# Patient Record
Sex: FEMALE | Race: WHITE | NOT HISPANIC OR LATINO | Employment: OTHER | URBAN - METROPOLITAN AREA
[De-identification: names, ages, dates, MRNs, and addresses within clinical notes are randomized per-mention and may not be internally consistent; named-entity substitution may affect disease eponyms.]

---

## 2017-01-02 ENCOUNTER — HOSPITAL ENCOUNTER (OUTPATIENT)
Dept: CARDIAC REHAB | Facility: CLINIC | Age: 82
End: 2017-01-02
Attending: NURSE PRACTITIONER
Payer: COMMERCIAL

## 2017-01-02 PROCEDURE — 40000116 ZZH STATISTIC OP CR VISIT

## 2017-01-02 PROCEDURE — 93798 PHYS/QHP OP CAR RHAB W/ECG: CPT

## 2017-01-04 ENCOUNTER — HOSPITAL ENCOUNTER (OUTPATIENT)
Dept: CARDIAC REHAB | Facility: CLINIC | Age: 82
End: 2017-01-04
Attending: NURSE PRACTITIONER
Payer: COMMERCIAL

## 2017-01-04 PROCEDURE — 40000116 ZZH STATISTIC OP CR VISIT: Performed by: REHABILITATION PRACTITIONER

## 2017-01-04 PROCEDURE — 93798 PHYS/QHP OP CAR RHAB W/ECG: CPT | Mod: KX | Performed by: REHABILITATION PRACTITIONER

## 2017-01-06 ENCOUNTER — HOSPITAL ENCOUNTER (OUTPATIENT)
Dept: CARDIAC REHAB | Facility: CLINIC | Age: 82
End: 2017-01-06
Attending: NURSE PRACTITIONER
Payer: COMMERCIAL

## 2017-01-06 VITALS — HEIGHT: 62 IN | WEIGHT: 121.6 LBS | BODY MASS INDEX: 22.38 KG/M2

## 2017-01-06 PROCEDURE — 40000575 ZZH STATISTIC OP CARDIAC VISIT #2: Performed by: OCCUPATIONAL THERAPIST

## 2017-01-06 PROCEDURE — 93798 PHYS/QHP OP CAR RHAB W/ECG: CPT | Mod: KX | Performed by: OCCUPATIONAL THERAPIST

## 2017-01-06 PROCEDURE — 93797 PHYS/QHP OP CAR RHAB WO ECG: CPT | Performed by: OCCUPATIONAL THERAPIST

## 2017-01-06 PROCEDURE — 93798 PHYS/QHP OP CAR RHAB W/ECG: CPT | Performed by: OCCUPATIONAL THERAPIST

## 2017-01-06 PROCEDURE — 40000116 ZZH STATISTIC OP CR VISIT: Performed by: OCCUPATIONAL THERAPIST

## 2017-01-06 ASSESSMENT — 6 MINUTE WALK TEST (6MWT)
MALE CALC: 1244.36
PREDICTED: 1251.95
TOTAL DISTANCE WALKED (FT): 1270
GENDER SELECTION: FEMALE
FEMALE CALC: 1326.99

## 2017-01-06 NOTE — PROGRESS NOTES
01/06/17 1400   Session   Session Progress Update   Certified through this date 01/05/17   Cardiac Rehab Assessment   Cardiac Rehab Assessment Charlotte Bradshaw is a 81 year old female with a past medical history of CAD with NSTEMI in 5/2015 and was admittted on 11/21/2016 with chest painand pressure. Charlotte has a history of situational depression from the loss of sigrid daughter and recently found out that her daughter in law has cancer.   She is reluctant to participate in cardiac rehab but understand that it will be a benefit to her health.   11/30  Patient did have 30 day ITP completed today.  12/28 Reviewed progress with Pt today. She has been participating only 1 x week due to the stress of the holidays, but plans to increase up to 3 x week after the New Year. She is progressing slowly, and appears to be actively grieving and easily brought to tears. She affirmed the goals she had made, and is planning to meet in a private consult to work on healthy grieving skills, and may follow up with the program . 1/6 Patient spoke to therapist about her grief and her daughter.  Therapist has known patient for several years.  Previously, patient could not talk without crying.  Now she only cries when her daughter is brought up directly.  She  is making progress in her grief, but does not want people to bring it up to her in rehab.  She understands the consequences of unresolved grief, but feels that she is not being seen or heard except for her grief. She did not find her previous consult with  helpful and declined another visit.  She is willing to look in general at stress and how to handle it.  She still is exploring where and when to exercise.  She was given information on the Wel program and encouraged to check out Silver [employer]neakers too.She continues to benefit from skilled services for progressive, monitored exercise, targeted education and behavior change counseling when indicated. Pt is also benefitting from  "skilled emotional support.     General Information   Treatment Diagnosis Stent   Date of Treatment Diagnosis 11/22/16   Secondary Treatment Diagnosis Stent   Significant Past CV History Previous PCI;Clinical significant depression or depressive symptoms   Comorbidities Previous MI   Other Medical History .pmh   Lead up symptoms Chest Pressure   Onset of Symptoms 11/22/16   Hospital Location [facility]   Hospital Discharge Date 11/23/16   Signs and Symptoms Post Hospital Discharge Anxiety   Outpatient Cardiac Rehab Start Date 11/28/16   Primary Physician Dr. Soni Blackwell   Primary Physician Follow Up NA   Surgeon Melvin Rodriguez MD   Surgeon Follow Up Scheduled  (12/5)   Cardiologist Dr. Dickson   Cardiologist Follow Up Scheduled  (2/3)   Ejection Fraction 60%   Risk Stratification Low   Summary of Cath Report   Summary of Cath Report Available   Date Performed 11/21/16   Left Main Left Main (LM) large caliber vessel with no angiographic evidence   LAD Left Anterior Descending (LAD): large caliber type 3 vessel which   LCX There is moderate focal disease with 50 % stenosis in the mid   RCA large caliber vessel which gives rise   Living and Work Status    Living Arrangements and Social Status house   Support System Live with an adult   Return to Employment Retired   Occupation    Preventative Medications   CMS recommended medications Ace inhibitors;Antiplatelets;Beta Blocker;Estrogen;Lipid Lowering   Falls Screen   Have you fallen two or more times in the past year? No   Pain   Patient Currently in Pain No   Physical Assessments   Incisions WNL   Edema Not assessed   Right Lung Sounds not assessed   Left Lung Sounds not assessed   Individualized Treatment Plan   Monitored Sessions Scheduled 24   Monitored Sessions Attended 7   Oxygen   Supplemental Oxygen needed No   Nutrition Management - Weight Management   Assessment Re-assessment   Age 81   Weight 55.157 kg (121 lb 9.6 oz)   Height 1.575 m (5' 2.01\") "   BMI (Calculated) 22.28   Initial Rate Your Plate Score. Dietary tool to assess eating patterns. Scores range from 24 to 72. The higher the score the healthier the eating pattern. 54   Weight Management Comments Pt is at a healthy weight    Nutrition Management - Lipids   Lipids Labs Available   Date 08/05/15   Total Cholesterol 117   Triglycerides 52   HDL 63   LDL 44   Nutrition Management - Diabetes   Diabetes No   Nutrition Management Summary   Dietary Recommendations Low Fat;Low Cholesterol;Low Sodium   Stages of Change for Diet Compliance Action   Nutrition Summary Comments Pt is currently at a healthy weights   Psychosocial Management   Psychosocial Assessment Re-assessment   Is there history of clinical depression or increased risk of depression? History of clinical depression   Current Level of Stress per Patient Report Moderate    Current Coping Skills Patient Unable to Identify Personal Coping Skills   Initial Patient Health Questionnaire -9 Score (PHQ-9) for depression. 5-9 Minimal symptoms, 10-14 Minor depression, 15-19 Major depression, moderately severe, > 20 Major depression, severe  5   Initial [facility] Survey score.  Quality of Life:   If total score > 25 review individual areas where patient rated a 4 or 5.  Consider patients current medical condition and what role that plays on the score.   Adjust treatment protocol to improve areas of concern.  Consider the following:  PHQ9 score, DASI, and re-assessment within the next 30 days to assist with developing treatments.  24   Interventions Planned Patient to verbalize understanding of behavioral assessment results;Patient to verbalize understanding of negative impact of stress to personal health   Patient Goal Yes   Goal Description Pt will describe 3 positive ways to deal with stress  that she can do to ohelp take care of her.   Goal Target Date 12/28/16   Progress Towards Goal 12/28 Pt has met once before with program  and is  considering it again. At this time, she has set up a private consult and would like to talk about healthy grieving, 1/6 Patient has decided she does not want people asking her about her grief anymore.  She is willing  to try to think of ways to handle stress, but does not want the constant mention of her daughter.  Patient does not feel that people are seeing any other aspect of her and may not be hearing her other concerns.  She did agree to let therapist give her a daily meditation book for people dealing with loss.   Psychosocial Comments Pt lost daughter 4.5 years ago and has learned taht daughter in law has cancer.   Other Core Components - Hypertension   History of or Diagnosis of Hypertension No   Currently taking Anti-Hypertensives Yes;Beta blocker;Ace Inhibitor   Other Core Components - Tobacco   History of Tobacco Use Never   Other Core Components Summary   Interventions Planned Other (see comments);Educate on importance of monitoring daily weight  (Check in and offer support for stress and depression )   Activity/Exercise History   Activity/Exercise Assessment Re-assessment   Activity/Exercise Status prior to event? Was Physically Active;Participated in an Exercise Program   Number of Days Currently participating in Moderate Physical Activity? 7   Number of Days Currently performing  Aerobic Exercise (including rehab)? 2   Number of Minutes per Session Currently of Aerobic Exercise (average)? 30-40  (Pt was a member of the [facility]EL program last spring)   Patient Goals Goal #1;Goal #2   Goal #1 Description Pt will find a  by the end of the year or purchase a gentle yoga DVD.   Goal #1 Target Date 01/02/17   Goal #1 Progress Towards Goal 12/28 within the next 2 week., Pt will check out current class offerings, and the costs. She would like to start by end of January. 1/6 Patient hasn't found any yoga tapes yet.  Talked with her about using [employer] tapes.  Sheis interested in Silver Sneakers and is  going to see if she can visit a location before joining.   Goal #2 Description Pt will demostarted that she is able to complete 45 minutes of moderate aerobic exercise with a normal CV response to that she can restart the WEL program.   Goal #2 Target Date 01/12/17   Goal #2 Progress Towards Goal 12/28 Pt will start by beginning indoor walking after the New Year holiday.  1/6 Patient has not started walking yet , but she is willing to be more actively seeking a place to exercise.  she said she will bring her treadmill out to use.    Exercise Assessment   6 Minute Walk Predicted - Gender Selection Female   6 Minute Walk Predicted (Male) 1244.36   6 Minute Walk Predicted (Female) 1326.99   Initial 6 Minute Walk Distance (Feet) 1270 ft   Resting HR 61 bpm   Exercise HR 78 bpm   Post Exercise HR 53 bpm   Resting /64 mmHg   Exercise /70 mmHg   Post Exercise /80 mmHg   Effort Rating 4   Current MET Level    MET Level Goal 5-6   ECG Rhythm Sinus bradycardia   Ectopy None   Current Symptoms Denies symptoms   Exercise Prescription   Mode Treadmill;Nustep;Airdyne;Ambulation;Weights   Duration/Time 15-30 min   Frequency 2 days/week  (Pt will increase to 3 x week soon after the New Year)   THR (85% of age predicted max HR) 118.15   OMNI Effort Rating (0-10 Scale) 4-6/10   Progression Continuous bouts;Total exercise time of 30-45 minutes;Progress peak intensity by 1/2 MET per week   Recommended Home Exercise   Type of Exercise Walking   Frequency (days per week) 2-3   Duration (minutes per session) 15-30 min   Effort Rating Recommended 4-6/10   30 Day Exercise Plan 12/28 Pt will start home walking after the New Year.    Current Home Exercise   Type of Exercise None   Follow-up/On-going Support   Provider follow-up needed on the following No follow-up needed   Learning Assessment   Learner Patient   Primary Language English   Preferred Learning Style Reading   Barriers to Learning No barriers noted   Patient  Education   Education recommended Exercise Principles;Stress Management   Education classes attended Patient Attended Education in the Past   Follow-up/On-going Support   Provider follow-up needed on the following No follow-up needed   Living and Work Status   Living Arrangements house   ADL Limitations None   Occupation Volunteer

## 2017-01-09 ENCOUNTER — HOSPITAL ENCOUNTER (OUTPATIENT)
Dept: CARDIAC REHAB | Facility: CLINIC | Age: 82
End: 2017-01-09
Attending: NURSE PRACTITIONER
Payer: COMMERCIAL

## 2017-01-09 PROCEDURE — 40000116 ZZH STATISTIC OP CR VISIT: Performed by: OCCUPATIONAL THERAPIST

## 2017-01-09 PROCEDURE — 93798 PHYS/QHP OP CAR RHAB W/ECG: CPT | Mod: KX | Performed by: OCCUPATIONAL THERAPIST

## 2017-01-11 ENCOUNTER — HOSPITAL ENCOUNTER (OUTPATIENT)
Dept: CARDIAC REHAB | Facility: CLINIC | Age: 82
End: 2017-01-11
Attending: NURSE PRACTITIONER
Payer: COMMERCIAL

## 2017-01-11 PROCEDURE — 40000116 ZZH STATISTIC OP CR VISIT: Performed by: OCCUPATIONAL THERAPIST

## 2017-01-11 PROCEDURE — 93797 PHYS/QHP OP CAR RHAB WO ECG: CPT

## 2017-01-11 PROCEDURE — 93798 PHYS/QHP OP CAR RHAB W/ECG: CPT | Mod: KX | Performed by: OCCUPATIONAL THERAPIST

## 2017-01-11 PROCEDURE — 40000575 ZZH STATISTIC OP CARDIAC VISIT #2

## 2017-01-13 ENCOUNTER — HOSPITAL ENCOUNTER (OUTPATIENT)
Dept: CARDIAC REHAB | Facility: CLINIC | Age: 82
End: 2017-01-13
Attending: NURSE PRACTITIONER
Payer: COMMERCIAL

## 2017-01-13 PROCEDURE — 40000116 ZZH STATISTIC OP CR VISIT: Performed by: OCCUPATIONAL THERAPIST

## 2017-01-13 PROCEDURE — 93798 PHYS/QHP OP CAR RHAB W/ECG: CPT | Mod: KX | Performed by: OCCUPATIONAL THERAPIST

## 2017-01-16 ENCOUNTER — HOSPITAL ENCOUNTER (OUTPATIENT)
Dept: CARDIAC REHAB | Facility: CLINIC | Age: 82
End: 2017-01-16
Attending: NURSE PRACTITIONER
Payer: COMMERCIAL

## 2017-01-16 PROCEDURE — 40000116 ZZH STATISTIC OP CR VISIT: Performed by: OCCUPATIONAL THERAPIST

## 2017-01-16 PROCEDURE — 93797 PHYS/QHP OP CAR RHAB WO ECG: CPT | Mod: KX | Performed by: OCCUPATIONAL THERAPIST

## 2017-01-16 PROCEDURE — 40000575 ZZH STATISTIC OP CARDIAC VISIT #2: Performed by: OCCUPATIONAL THERAPIST

## 2017-01-16 PROCEDURE — 93798 PHYS/QHP OP CAR RHAB W/ECG: CPT | Mod: KX | Performed by: OCCUPATIONAL THERAPIST

## 2017-01-18 ENCOUNTER — HOSPITAL ENCOUNTER (OUTPATIENT)
Dept: CARDIAC REHAB | Facility: CLINIC | Age: 82
End: 2017-01-18
Attending: NURSE PRACTITIONER
Payer: COMMERCIAL

## 2017-01-18 PROCEDURE — 40000116 ZZH STATISTIC OP CR VISIT: Mod: KX

## 2017-01-18 PROCEDURE — 93798 PHYS/QHP OP CAR RHAB W/ECG: CPT

## 2017-01-20 ENCOUNTER — HOSPITAL ENCOUNTER (OUTPATIENT)
Dept: CARDIAC REHAB | Facility: CLINIC | Age: 82
End: 2017-01-20
Attending: NURSE PRACTITIONER
Payer: COMMERCIAL

## 2017-01-20 VITALS — WEIGHT: 123 LBS | HEIGHT: 62 IN | BODY MASS INDEX: 22.63 KG/M2

## 2017-01-20 PROCEDURE — 93798 PHYS/QHP OP CAR RHAB W/ECG: CPT | Mod: KX | Performed by: REHABILITATION PRACTITIONER

## 2017-01-20 PROCEDURE — 40000116 ZZH STATISTIC OP CR VISIT: Performed by: REHABILITATION PRACTITIONER

## 2017-01-20 ASSESSMENT — 6 MINUTE WALK TEST (6MWT)
FEMALE CALC: 1322.21
MALE CALC: 1240.68
GENDER SELECTION: FEMALE
PREDICTED: 1248.25
TOTAL DISTANCE WALKED (FT): 1270

## 2017-01-20 NOTE — PROGRESS NOTES
Physician cosignature/electronic signature indicates approval of this ITP document. I have established, reviewed and made necessary changes to the individualized treatment plan and exercise prescription for this patient.         01/20/17 1300   Session   Session Progress Update   Certified through this date 01/05/17   Cardiac Rehab Assessment   Cardiac Rehab Assessment Charlotte Bradshaw is a 81 year old female with a past medical history of CAD with NSTEMI in 5/2015 and was admittted on 11/21/2016 with chest painand pressure. Charlotte has a history of situational depression from the loss of sigrid daughter and recently found out that her daughter in law has cancer.   She is reluctant to participate in cardiac rehab but understand that it will be a benefit to her health.   11/30  Patient did have 30 day ITP completed today.  12/28 Reviewed progress with Pt today. She has been participating only 1 x week due to the stress of the holidays, but plans to increase up to 3 x week after the New Year. She is progressing slowly, and appears to be actively grieving and easily brought to tears. She affirmed the goals she had made, and is planning to meet in a private consult to work on healthy grieving skills, and may follow up with the program . 1/6 Patient spoke to therapist about her grief and her daughter.  Therapist has known patient for several years.  Previously, patient could not talk without crying.  Now she only cries when her daughter is brought up directly.  She  is making progress in her grief, but does not want people to bring it up to her in rehab.  She understands the consequences of unresolved grief, but feels that she is not being seen or heard except for her grief. She did not find her previous consult with  helpful and declined another visit.  She is willing to look in general at stress and how to handle it.  She still is exploring where and when to exercise.  She was given information on the Wel program  and encouraged to check out Silver Sneakers too.She continues to benefit from skilled services for progressive, monitored exercise, targeted education and behavior change counseling when indicated. Pt is also benefitting from skilled emotional support.    1/20 Patient continues to make good progress in rehab with normal CV responses.  She is pleased with her progress.  Patient continues to benefit from skilled intervention for progressive monitor exercise and assistance in reaching her goal of maintaining home exercise and stress management.   General Information   Treatment Diagnosis Stent   Date of Treatment Diagnosis 11/22/16   Secondary Treatment Diagnosis Stent   Significant Past CV History Previous PCI;Clinical significant depression or depressive symptoms   Comorbidities Previous MI   Other Medical History .pmh   Lead up symptoms Chest Pressure   Onset of Symptoms 11/22/16   Hospital Location [facility] Discharge Date 11/23/16   Signs and Symptoms Post Hospital Discharge Anxiety   Outpatient Cardiac Rehab Start Date 11/28/16   Primary Physician Dr. Soni Blackwell   Primary Physician Follow Up NA   Surgeon Melvin Rodriguez MD   Surgeon Follow Up Scheduled  (12/5)   Cardiologist Dr. Dickson   Cardiologist Follow Up Scheduled  (2/3)   Ejection Fraction 60%   Risk Stratification Low   Summary of Cath Report   Summary of Cath Report Available   Date Performed 11/21/16   Left Main Left Main (LM) large caliber vessel with no angiographic evidence   LAD Left Anterior Descending (LAD): large caliber type 3 vessel which   LCX There is moderate focal disease with 50 % stenosis in the mid   RCA large caliber vessel which gives rise   Living and Work Status    Living Arrangements and Social Status house   Support System Live with an adult   Return to Employment Retired   Occupation    Preventative Medications   CMS recommended medications Ace inhibitors;Antiplatelets;Beta Blocker;Estrogen;Lipid  "Lowering   Falls Screen   Have you fallen two or more times in the past year? No   Pain   Patient Currently in Pain No   Physical Assessments   Incisions WNL   Edema Not assessed   Right Lung Sounds not assessed   Left Lung Sounds not assessed   Individualized Treatment Plan   Monitored Sessions Scheduled 24   Monitored Sessions Attended 13   Oxygen   Supplemental Oxygen needed No   Nutrition Management - Weight Management   Assessment Re-assessment   Age 81   Weight 55.792 kg (123 lb)   Height 1.575 m (5' 2.01\")   BMI (Calculated) 22.54   Initial Rate Your Plate Score. Dietary tool to assess eating patterns. Scores range from 24 to 72. The higher the score the healthier the eating pattern. 54   Weight Management Comments Pt is at a healthy weight    Nutrition Management - Lipids   Lipids Labs Available   Date 08/05/15   Total Cholesterol 117   Triglycerides 52   HDL 63   LDL 44   Nutrition Management - Diabetes   Diabetes No   Nutrition Management Summary   Dietary Recommendations Low Fat;Low Cholesterol;Low Sodium   Stages of Change for Diet Compliance Action   Nutrition Summary Comments Pt is currently at a healthy weights   Psychosocial Management   Psychosocial Assessment Re-assessment   Is there history of clinical depression or increased risk of depression? History of clinical depression   Current Level of Stress per Patient Report Moderate    Current Coping Skills Patient Unable to Identify Personal Coping Skills   Initial Patient Health Questionnaire -9 Score (PHQ-9) for depression. 5-9 Minimal symptoms, 10-14 Minor depression, 15-19 Major depression, moderately severe, > 20 Major depression, severe  5   Initial [facility] Survey score.  Quality of Life:   If total score > 25 review individual areas where patient rated a 4 or 5.  Consider patients current medical condition and what role that plays on the score.   Adjust treatment protocol to improve areas of concern.  Consider the following:  PHQ9 " score, DASI, and re-assessment within the next 30 days to assist with developing treatments.  24   Interventions Planned Patient to verbalize understanding of behavioral assessment results;Patient to verbalize understanding of negative impact of stress to personal health   Patient Goal Yes   Goal Description Pt will describe 3 positive ways to deal with stress  that she can do to ohelp take care of her.   Goal Target Date 12/28/16   Progress Towards Goal 12/28 Pt has met once before with program  and is considering it again. At this time, she has set up a private consult and would like to talk about healthy grieving, 1/6 Patient has decided she does not want people asking her about her grief anymore.  She is willing  to try to think of ways to handle stress, but does not want the constant mention of her daughter.  Patient does not feel that people are seeing any other aspect of her and may not be hearing her other concerns.  She did agree to let therapist give her a daily meditation book for people dealing with loss.   Psychosocial Comments Pt lost daughter 4.5 years ago and has learned taht daughter in law has cancer.   Other Core Components - Hypertension   History of or Diagnosis of Hypertension No   Currently taking Anti-Hypertensives Yes;Beta blocker;Ace Inhibitor   Other Core Components - Tobacco   History of Tobacco Use Never   Other Core Components Summary   Interventions Planned Other (see comments);Educate on importance of monitoring daily weight  (Check in and offer support for stress and depression )   Activity/Exercise History   Activity/Exercise Assessment Re-assessment   Activity/Exercise Status prior to event? Was Physically Active;Participated in an Exercise Program   Number of Days Currently participating in Moderate Physical Activity? 7   Number of Days Currently performing  Aerobic Exercise (including rehab)? 3   Number of Minutes per Session Currently of Aerobic Exercise (average)? 30-40    Patient Goals Goal #1;Goal #2   Goal #1 Description Pt will find a  by the end of the year or purchase a gentle yoga DVD.   Goal #1 Target Date 01/02/17   Goal #1 Progress Towards Goal 12/28 within the next 2 week., Pt will check out current class offerings, and the costs. She would like to start by end of January. 1/6 Patient hasn't found any yoga tapes yet.  Talked with her about using [employer] tapes.  Sheis interested in Silver Sneakers and is going to see if she can visit a location before joining.   Goal #2 Description Pt will demostarted that she is able to complete 45 minutes of moderate aerobic exercise with a normal CV response to that she can restart the WEL program.   Goal #2 Target Date 01/12/17   Goal #2 Progress Towards Goal 12/28 Pt will start by beginning indoor walking after the New Year holiday.  1/6 Patient has not started walking yet , but she is willing to be more actively seeking a place to exercise.  she said she will bring her treadmill out to use.   1/20 Patient continues to consider joinging the WEL program.  Discussed the benefits and details of the program.  Patient appears to be leaning to joining the program with her .   Exercise Assessment   6 Minute Walk Predicted - Gender Selection Female   6 Minute Walk Predicted (Male) 1240.68   6 Minute Walk Predicted (Female) 1322.21   Initial 6 Minute Walk Distance (Feet) 1270 ft   Resting HR 63 bpm   Exercise HR 80 bpm   Post Exercise HR 58 bpm   Resting /78 mmHg   Exercise /70 mmHg   Post Exercise /64 mmHg   Effort Rating 5   Current MET Level 4.6   MET Level Goal 5-6   ECG Rhythm Sinus bradycardia   Ectopy None   Current Symptoms Denies symptoms   Exercise Prescription   Mode Treadmill;Nustep;Airdyne;Ambulation;Weights   Duration/Time 15-30 min   Frequency 2 days/week  (Pt will increase to 3 x week soon after the New Year)   THR (85% of age predicted max HR) 118.15   OMNI Effort Rating (0-10 Scale)  4-6/10   Progression Continuous bouts;Total exercise time of 30-45 minutes;Progress peak intensity by 1/2 MET per week   Recommended Home Exercise   Type of Exercise Walking   Frequency (days per week) 2-3   Duration (minutes per session) 15-30 min   Effort Rating Recommended 4-6/10   30 Day Exercise Plan 12/28 Pt will start home walking after the New Year.    Current Home Exercise   Type of Exercise None   Follow-up/On-going Support   Provider follow-up needed on the following No follow-up needed   Learning Assessment   Learner Patient   Primary Language English   Preferred Learning Style Reading   Barriers to Learning No barriers noted   Patient Education   Education recommended Exercise Principles;Stress Management   Education classes attended Patient Attended Education in the Past   Follow-up/On-going Support   Provider follow-up needed on the following No follow-up needed   Living and Work Status   Living Arrangements house   ADL Limitations None   Occupation Volunteer

## 2017-01-20 NOTE — PROGRESS NOTES
Physician cosignature/electronic signature indicates approval of this ITP document. I have established, reviewed and made necessary changes to the individualized treatment plan and exercise prescription for this patient.     01/20/17 1300   Session   Session 90 Day Individualized Treatment Plan   Certified through this date 03/04/17   Cardiac Rehab Assessment   Cardiac Rehab Assessment Charlotte Bradshaw is a 81 year old female with a past medical history of CAD with NSTEMI in 5/2015 and was admittted on 11/21/2016 with chest painand pressure. Charlotte has a history of situational depression from the loss of sigrid daughter and recently found out that her daughter in law has cancer.   She is reluctant to participate in cardiac rehab but understand that it will be a benefit to her health.   11/30  Patient did have 30 day ITP completed today.  12/28 Reviewed progress with Pt today. She has been participating only 1 x week due to the stress of the holidays, but plans to increase up to 3 x week after the New Year. She is progressing slowly, and appears to be actively grieving and easily brought to tears. She affirmed the goals she had made, and is planning to meet in a private consult to work on healthy grieving skills, and may follow up with the program . 1/6 Patient spoke to therapist about her grief and her daughter.  Therapist has known patient for several years.  Previously, patient could not talk without crying.  Now she only cries when her daughter is brought up directly.  She  is making progress in her grief, but does not want people to bring it up to her in rehab.  She understands the consequences of unresolved grief, but feels that she is not being seen or heard except for her grief. She did not find her previous consult with  helpful and declined another visit.  She is willing to look in general at stress and how to handle it.  She still is exploring where and when to exercise.  She was given information  on the Wel program and encouraged to check out Silver Sneakers too.She continues to benefit from skilled services for progressive, monitored exercise, targeted education and behavior change counseling when indicated. Pt is also benefitting from skilled emotional support.    1/20 Patient continues to make good progress in rehab with normal CV responses.  She is pleased with her progress.  Patient continues to benefit from skilled intervention for progressive monitor exercise and assistance in reaching her goal of maintaining home exercise and stress manaement.   General Information   Treatment Diagnosis Stent   Date of Treatment Diagnosis 11/22/16   Secondary Treatment Diagnosis Stent   Significant Past CV History Previous PCI;Clinical significant depression or depressive symptoms   Comorbidities Previous MI   Other Medical History .pmh   Lead up symptoms Chest Pressure   Onset of Symptoms 11/22/16   Hospital Location [facility] Discharge Date 11/23/16   Signs and Symptoms Post Hospital Discharge Anxiety   Outpatient Cardiac Rehab Start Date 11/28/16   Primary Physician Dr. Soni Blackwell   Primary Physician Follow Up NA   Surgeon Melvin Rodriguez MD   Surgeon Follow Up Scheduled  (12/5)   Cardiologist Dr. Dickson   Cardiologist Follow Up Scheduled  (2/3)   Ejection Fraction 60%   Risk Stratification Low   Summary of Cath Report   Summary of Cath Report Available   Date Performed 11/21/16   Left Main Left Main (LM) large caliber vessel with no angiographic evidence   LAD Left Anterior Descending (LAD): large caliber type 3 vessel which   LCX There is moderate focal disease with 50 % stenosis in the mid   RCA large caliber vessel which gives rise   Living and Work Status    Living Arrangements and Social Status house   Support System Live with an adult   Return to Employment Retired   Occupation [place]   Preventative Medications   CMS recommended medications Ace inhibitors;Antiplatelets;Beta  "Blocker;Estrogen;Lipid Lowering   Falls Screen   Have you fallen two or more times in the past year? No   Pain   Patient Currently in Pain No   Physical Assessments   Incisions WNL   Edema Not assessed   Right Lung Sounds not assessed   Left Lung Sounds not assessed   Individualized Treatment Plan   Monitored Sessions Scheduled 24   Monitored Sessions Attended 13   Oxygen   Supplemental Oxygen needed No   Nutrition Management - Weight Management   Assessment Re-assessment   Age 81   Weight 55.792 kg (123 lb)   Height 1.575 m (5' 2.01\")   BMI (Calculated) 22.54   Initial Rate Your Plate Score. Dietary tool to assess eating patterns. Scores range from 24 to 72. The higher the score the healthier the eating pattern. 54   Weight Management Comments Pt is at a healthy weight    Nutrition Management - Lipids   Lipids Labs Available   Date 08/05/15   Total Cholesterol 117   Triglycerides 52   HDL 63   LDL 44   Nutrition Management - Diabetes   Diabetes No   Nutrition Management Summary   Dietary Recommendations Low Fat;Low Cholesterol;Low Sodium   Stages of Change for Diet Compliance Action   Nutrition Summary Comments Pt is currently at a healthy weights   Psychosocial Management   Psychosocial Assessment Re-assessment   Is there history of clinical depression or increased risk of depression? History of clinical depression   Current Level of Stress per Patient Report Moderate    Current Coping Skills Patient Unable to Identify Personal Coping Skills   Initial Patient Health Questionnaire -9 Score (PHQ-9) for depression. 5-9 Minimal symptoms, 10-14 Minor depression, 15-19 Major depression, moderately severe, > 20 Major depression, severe  5   Initial [facility] Survey score.  Quality of Life:   If total score > 25 review individual areas where patient rated a 4 or 5.  Consider patients current medical condition and what role that plays on the score.   Adjust treatment protocol to improve areas of concern.  Consider the " following:  PHQ9 score, DASI, and re-assessment within the next 30 days to assist with developing treatments.  24   Interventions Planned Patient to verbalize understanding of behavioral assessment results;Patient to verbalize understanding of negative impact of stress to personal health   Patient Goal Yes   Goal Description Pt will describe 3 positive ways to deal with stress  that she can do to ohelp take care of her.   Goal Target Date 12/28/16   Progress Towards Goal 12/28 Pt has met once before with program  and is considering it again. At this time, she has set up a private consult and would like to talk about healthy grieving, 1/6 Patient has decided she does not want people asking her about her grief anymore.  She is willing  to try to think of ways to handle stress, but does not want the constant mention of her daughter.  Patient does not feel that people are seeing any other aspect of her and may not be hearing her other concerns.  She did agree to let therapist give her a daily meditation book for people dealing with loss.   Psychosocial Comments Pt lost daughter 4.5 years ago and has learned taht daughter in law has cancer.   Other Core Components - Hypertension   History of or Diagnosis of Hypertension No   Currently taking Anti-Hypertensives Yes;Beta blocker;Ace Inhibitor   Other Core Components - Tobacco   History of Tobacco Use Never   Other Core Components Summary   Interventions Planned Other (see comments);Educate on importance of monitoring daily weight  (Check in and offer support for stress and depression )   Activity/Exercise History   Activity/Exercise Assessment Re-assessment   Activity/Exercise Status prior to event? Was Physically Active;Participated in an Exercise Program   Number of Days Currently participating in Moderate Physical Activity? 7   Number of Days Currently performing  Aerobic Exercise (including rehab)? 3   Number of Minutes per Session Currently of Aerobic Exercise  (average)? 30-40   Patient Goals Goal #1;Goal #2   Goal #1 Description Pt will find a  by the end of the year or purchase a gentle yoga DVD.   Goal #1 Target Date 01/02/17   Goal #1 Progress Towards Goal 12/28 within the next 2 week., Pt will check out current class offerings, and the costs. She would like to start by end of January. 1/6 Patient hasn't found any yoga tapes yet.  Talked with her about using [employer] tapes.  Sheis interested in Silver Sneakers and is going to see if she can visit a location before joining.   Goal #2 Description Pt will demostarted that she is able to complete 45 minutes of moderate aerobic exercise with a normal CV response to that she can restart the WEL program.   Goal #2 Target Date 01/12/17   Goal #2 Progress Towards Goal 12/28 Pt will start by beginning indoor walking after the New Year holiday.  1/6 Patient has not started walking yet , but she is willing to be more actively seeking a place to exercise.  she said she will bring her treadmill out to use.   1/20 Patient continues to consider joinging the WEL program.  Discussed the benefits and details of the program.  Patient appears to be leaning to joining the program with her .   Exercise Assessment   6 Minute Walk Predicted - Gender Selection Female   6 Minute Walk Predicted (Male) 1240.68   6 Minute Walk Predicted (Female) 1322.21   Initial 6 Minute Walk Distance (Feet) 1270 ft   Resting HR 63 bpm   Exercise HR 80 bpm   Post Exercise HR 58 bpm   Resting /78 mmHg   Exercise /70 mmHg   Post Exercise /64 mmHg   Effort Rating 5   Current MET Level 4.6   MET Level Goal 5-6   ECG Rhythm Sinus bradycardia   Ectopy None   Current Symptoms Denies symptoms   Exercise Prescription   Mode Treadmill;Nustep;Airdyne;Ambulation;Weights   Duration/Time 15-30 min   Frequency 2 days/week  (Pt will increase to 3 x week soon after the New Year)   THR (85% of age predicted max HR) 118.15   OMNI Effort Rating  (0-10 Scale) 4-6/10   Progression Continuous bouts;Total exercise time of 30-45 minutes;Progress peak intensity by 1/2 MET per week   Recommended Home Exercise   Type of Exercise Walking   Frequency (days per week) 2-3   Duration (minutes per session) 15-30 min   Effort Rating Recommended 4-6/10   30 Day Exercise Plan 12/28 Pt will start home walking after the New Year.    Current Home Exercise   Type of Exercise None   Follow-up/On-going Support   Provider follow-up needed on the following No follow-up needed   Learning Assessment   Learner Patient   Primary Language English   Preferred Learning Style Reading   Barriers to Learning No barriers noted   Patient Education   Education recommended Exercise Principles;Stress Management   Education classes attended Patient Attended Education in the Past   Follow-up/On-going Support   Provider follow-up needed on the following No follow-up needed   Living and Work Status   Living Arrangements house   ADL Limitations None   Occupation Volunteer

## 2017-01-23 ENCOUNTER — HOSPITAL ENCOUNTER (OUTPATIENT)
Dept: CARDIAC REHAB | Facility: CLINIC | Age: 82
End: 2017-01-23
Attending: NURSE PRACTITIONER
Payer: COMMERCIAL

## 2017-01-23 PROCEDURE — 93798 PHYS/QHP OP CAR RHAB W/ECG: CPT | Mod: KX | Performed by: OCCUPATIONAL THERAPIST

## 2017-01-23 PROCEDURE — 40000116 ZZH STATISTIC OP CR VISIT: Performed by: OCCUPATIONAL THERAPIST

## 2017-01-25 ENCOUNTER — HOSPITAL ENCOUNTER (OUTPATIENT)
Dept: CARDIAC REHAB | Facility: CLINIC | Age: 82
End: 2017-01-25
Attending: NURSE PRACTITIONER
Payer: COMMERCIAL

## 2017-01-25 PROCEDURE — 93798 PHYS/QHP OP CAR RHAB W/ECG: CPT | Mod: KX | Performed by: REHABILITATION PRACTITIONER

## 2017-01-25 PROCEDURE — 40000116 ZZH STATISTIC OP CR VISIT: Performed by: REHABILITATION PRACTITIONER

## 2017-01-27 ENCOUNTER — HOSPITAL ENCOUNTER (OUTPATIENT)
Dept: CARDIAC REHAB | Facility: CLINIC | Age: 82
End: 2017-01-27
Attending: NURSE PRACTITIONER
Payer: COMMERCIAL

## 2017-01-27 PROCEDURE — 40000116 ZZH STATISTIC OP CR VISIT: Performed by: REHABILITATION PRACTITIONER

## 2017-01-27 PROCEDURE — 93798 PHYS/QHP OP CAR RHAB W/ECG: CPT | Mod: KX | Performed by: REHABILITATION PRACTITIONER

## 2017-02-01 ENCOUNTER — HOSPITAL ENCOUNTER (OUTPATIENT)
Dept: CARDIAC REHAB | Facility: CLINIC | Age: 82
End: 2017-02-01
Attending: NURSE PRACTITIONER
Payer: COMMERCIAL

## 2017-02-01 PROCEDURE — 40000116 ZZH STATISTIC OP CR VISIT: Performed by: OCCUPATIONAL THERAPIST

## 2017-02-01 PROCEDURE — 93798 PHYS/QHP OP CAR RHAB W/ECG: CPT | Performed by: OCCUPATIONAL THERAPIST

## 2017-02-03 ENCOUNTER — HOSPITAL ENCOUNTER (OUTPATIENT)
Dept: CARDIAC REHAB | Facility: CLINIC | Age: 82
End: 2017-02-03
Attending: NURSE PRACTITIONER
Payer: COMMERCIAL

## 2017-02-03 ENCOUNTER — OFFICE VISIT (OUTPATIENT)
Dept: CARDIOLOGY | Facility: CLINIC | Age: 82
End: 2017-02-03
Payer: COMMERCIAL

## 2017-02-03 VITALS
HEIGHT: 62 IN | WEIGHT: 122.9 LBS | HEART RATE: 60 BPM | BODY MASS INDEX: 22.62 KG/M2 | DIASTOLIC BLOOD PRESSURE: 62 MMHG | SYSTOLIC BLOOD PRESSURE: 126 MMHG

## 2017-02-03 DIAGNOSIS — I24.9 ACS (ACUTE CORONARY SYNDROME) (H): Primary | ICD-10-CM

## 2017-02-03 DIAGNOSIS — I25.5 ISCHEMIC CARDIOMYOPATHY: ICD-10-CM

## 2017-02-03 PROCEDURE — 40000116 ZZH STATISTIC OP CR VISIT: Mod: KX

## 2017-02-03 PROCEDURE — 93798 PHYS/QHP OP CAR RHAB W/ECG: CPT | Mod: KX

## 2017-02-03 PROCEDURE — 99214 OFFICE O/P EST MOD 30 MIN: CPT | Performed by: INTERNAL MEDICINE

## 2017-02-03 NOTE — PROGRESS NOTES
"HPI and Plan:   See dictation    No orders of the defined types were placed in this encounter.       No orders of the defined types were placed in this encounter.       Encounter Diagnoses   Name Primary?     ACS (acute coronary syndrome) (H) Yes     Ischemic cardiomyopathy        CURRENT MEDICATIONS:  Current Outpatient Prescriptions   Medication Sig Dispense Refill     clopidogrel (PLAVIX) 75 MG tablet Take 1 tablet (75 mg) by mouth daily 30 tablet 3     lisinopril (PRINIVIL,ZESTRIL) 5 MG tablet Take 1 tablet (5 mg) by mouth daily 30 tablet 3     metoprolol (TOPROL-XL) 25 MG 24 hr tablet Take 0.5 tablets (12.5 mg) by mouth daily 45 tablet 3     atorvastatin (LIPITOR) 40 MG tablet Take 1 tablet (40 mg) by mouth daily 90 tablet 2     estrogens, conjugated, (PREMARIN) 0.3 MG tablet Take 1 tablet (0.3 mg) by mouth daily (Patient taking differently: Take 0.3 mg by mouth daily Rxed as daily but takes every 4th day or so) 90 tablet 3     ammonium lactate (AMLACTIN) 12 % cream Apply topically daily as needed        aspirin EC 81 MG EC tablet Take 1 tablet (81 mg) by mouth daily 30 tablet 0     multivitamin, therapeutic with minerals (THERA-VIT-M) TABS Take 1 tablet by mouth daily       vitamin  B complex with vitamin C (VITAMIN  B COMPLEX) TABS Take 1 tablet by mouth daily       VITAMIN E NATURAL PO Take 400 Units by mouth daily         ALLERGIES     Allergies   Allergen Reactions     Codeine Camsylate Other (See Comments)     \"I get loopy\"       PAST MEDICAL HISTORY:  Past Medical History   Diagnosis Date     CAD (coronary artery disease) 5/2015     Left Heart cath - 90% stenosis mid LAD - PTCA/birfurcation stenting with MICKIE of mid LAD and 2nd diagonal branch placed and 100% occlusion 2nd diagonal branch - PTCA/MICKIE, 5/2015 EF 40-45% by Echo     NSTEMI (non-ST elevated myocardial infarction) (H) 5/21/2015     Depression      Shortness of breath      Ischemic cardiomyopathy      Carcinoma in situ of skin      Hormone " "replacement therapy      she wants to continue HRT even though she is aware that it increases her lifetime risk of breast cancer.     Menopausal syndrome      Osteoporosis 2007       PAST SURGICAL HISTORY:  Past Surgical History   Procedure Laterality Date     Biopsy of skin lesion       Orthopedic surgery  2006     hip replacement     Heart cath left heart cath  5/21/2015     MICKIE to second diagonal branch, bifurcation stent to Mid LAD. Severe 90% stenosis in mid LAD and 100% occlusion of second diagonal branch       FAMILY HISTORY:  Family History   Problem Relation Age of Onset     CANCER Mother      Coronary Artery Disease Father 77     MI     Breast Cancer Daughter 53     daughter has stage IV breast cancer at age 53       SOCIAL HISTORY:  Social History     Social History     Marital Status:      Spouse Name: N/A     Number of Children: N/A     Years of Education: N/A     Social History Main Topics     Smoking status: Never Smoker      Smokeless tobacco: Never Used     Alcohol Use: 0.0 oz/week     0 Standard drinks or equivalent per week      Comment: Rare     Drug Use: No     Sexual Activity: Yes     Birth Control/ Protection: Post-menopausal     Other Topics Concern     Parent/Sibling W/ Cabg, Mi Or Angioplasty Before 65f 55m? No     Caffeine Concern No     no caffeine     Sleep Concern No     Stress Concern No     Special Diet No     Exercise Yes     walking dog, 2 story house, folk dancing     Seat Belt Yes     Social History Narrative       Review of Systems:  Skin:  Negative     Eyes:  Positive for glasses  ENT:  Positive for tinnitus  Respiratory:  Negative    Cardiovascular:  Negative    Gastroenterology: Negative    Genitourinary:  not assessed    Musculoskeletal:  Negative    Neurologic:  Negative    Psychiatric:  Negative    Heme/Lymph/Imm:  Negative    Endocrine:  Negative      Physical Exam:  Vitals: /62 mmHg  Pulse 60  Ht 1.575 m (5' 2\")  Wt 55.747 kg (122 lb 14.4 oz)  BMI 22.47 " kg/m2    Constitutional:           Skin:           Head:           Eyes:           ENT:           Neck:           Chest:           Cardiac:                    Abdomen:           Vascular:                                        Extremities and Back:           Neurological:             Recent Lab Results:  LIPID RESULTS:  Lab Results   Component Value Date    CHOL 117 08/05/2015    HDL 63 08/05/2015    LDL 44* 08/05/2015    TRIG 52 08/05/2015    CHOLHDLRATIO 1.9 08/05/2015       LIVER ENZYME RESULTS:  Lab Results   Component Value Date    AST 24 11/21/2016    ALT 31 11/21/2016       CBC RESULTS:  Lab Results   Component Value Date    WBC 6.6 11/23/2016    RBC 3.80 11/23/2016    HGB 12.2 11/23/2016    HCT 36.0 11/23/2016    MCV 95 11/23/2016    MCH 32.1 11/23/2016    MCHC 33.9 11/23/2016    RDW 12.3 11/23/2016     11/23/2016       BMP RESULTS:  Lab Results   Component Value Date     12/05/2016    POTASSIUM 4.0 12/05/2016    CHLORIDE 104 12/05/2016    CO2 32* 12/05/2016    ANIONGAP 9 12/05/2016    GLC 98 12/05/2016    BUN 16 12/05/2016    CR 0.92 12/05/2016    GFRESTIMATED 59* 12/05/2016    GFRESTBLACK 71 12/05/2016    COLTEN 9.5 12/05/2016        A1C RESULTS:  Lab Results   Component Value Date    A1C 5.7 05/21/2015       INR RESULTS:  Lab Results   Component Value Date    INR 1.00 05/21/2015    INR 1.67* 01/06/2006           CC  No referring provider defined for this encounter.

## 2017-02-03 NOTE — Clinical Note
2/3/2017    Soni Blackwell MD, MD  [address]  [facility] [address]    RE: Charlotte Bradshaw       Dear Colleague,    I had the pleasure of seeing Charlotte Bradshaw in the [facility].    HISTORY OF PRESENT ILLNESS:  Mrs. Bradshaw returns for followup of coronary artery disease.  In 2015, she had a non-Q-wave myocardial infarction with mild left ventricular systolic dysfunction.  The culprit artery was the LAD.  She had percutaneous revascularization of the LAD and second diagonal, after which her wall motion abnormality resolved and there was normalization of overall left ventricular systolic function.  When I last saw her in 03/2016, she was doing well.  At the end of 2016, she was admitted with recurrent chest discomfort and her troponin peak was 12.  At angiography, she was now found to have a tight stenosis involving her right PDA.  This was successfully angioplastied and stented.  She is now on Plavix, which she tolerates well.      She did have what sounded like a radial hematoma, but this is completely resolved.  She is well with no recurrence of any anginal symptoms.  Pulses are good in her wrists.      PHYSICAL EXAMINATION:  Cardiovascular system examination is normal.  She feels well with no recurrence of anginal-type symptoms.      IMPRESSION:   1.  Stable coronary artery disease.  With her history, I think there would be a good case for continuing with dual antiplatelet therapy indefinitely, especially in the absence of any significant bleeding issues.   2.  Dyslipidemia.  On moderate-dose statin.  Last LDL was in the 40s, which is excellent.   3.  Hypertension.  Under good control.        She is stable.  I would like to see her again in 6 months' time for further followup.         AVA MORALES MD, FACC

## 2017-02-03 NOTE — MR AVS SNAPSHOT
"              After Visit Summary   2/3/2017    Charlotte Bradshaw    MRN: [id number]           Patient Information     Date Of Birth          1935        Visit Information        Provider Department      2/3/2017 2:45 PM Danni, Abilio Douglas MD [facility] [place]        Today's Diagnoses     ACS (acute coronary syndrome) (H)    -  1     Ischemic cardiomyopathy            Follow-ups after your visit        Your next 10 appointments already scheduled     Feb 06, 2017  1:00 PM   Treatment 60 with Sh Cardiac Rehab 1   [facility] ([facility])    [address]  [facility] [address]   [phone]            Mar 08, 2017 11:30 AM   PHYSICAL with Kirstie Rivera MD   [facility] [place] ([facility])    [address]  [place] [address] [phone]              Who to contact     If you have questions or need follow up information about today's clinic visit or your schedule please contact [facility] [place] directly at [phone].  Normal or non-critical lab and imaging results will be communicated to you by MyChart, letter or phone within 4 business days after the clinic has received the results. If you do not hear from us within 7 days, please contact the clinic through AMDLhart or phone. If you have a critical or abnormal lab result, we will notify you by phone as soon as possible.  Submit refill requests through [employer] or call your pharmacy and they will forward the refill request to us. Please allow 3 business days for your refill to be completed.          Additional Information About Your Visit        MyChart Information     MeeWee lets you send messages to your doctor, view your test results, renew your prescriptions, schedule appointments and more. To sign up, go to [email or web address] . Click on \"Log in\" on the left side of the " "screen, which will take you to the Welcome page. Then click on \"Sign up Now\" on the right side of the page.     You will be asked to enter the access code listed below, as well as some personal information. Please follow the directions to create your username and password.     Your access code is: [email or web address]  Expires: 2017 10:49 AM     Your access code will  in 90 days. If you need help or a new code, please call your [place] clinic or [phone].        Care EveryWhere ID     This is your Care EveryWhere ID. This could be used by other organizations to access your [place] medical records  [id number]        Your Vitals Were     Pulse Height BMI (Body Mass Index)             60 1.575 m (5' 2\") 22.47 kg/m2          Blood Pressure from Last 3 Encounters:   17 126/62   16 120/80   16 145/79    Weight from Last 3 Encounters:   17 55.747 kg (122 lb 14.4 oz)   17 55.792 kg (123 lb)   17 55.157 kg (121 lb 9.6 oz)              Today, you had the following     No orders found for display         Today's Medication Changes          These changes are accurate as of: 2/3/17  3:16 PM.  If you have any questions, ask your nurse or doctor.               These medicines have changed or have updated prescriptions.        Dose/Directions    estrogens (conjugated) 0.3 MG tablet   Commonly known as:  PREMARIN   This may have changed:  additional instructions   Used for:  Post-menopause on HRT (hormone replacement therapy)        Dose:  0.3 mg   Take 1 tablet (0.3 mg) by mouth daily   Quantity:  90 tablet   Refills:  3                Primary Care Provider Office Phone # Fax #    Soni Blackwell [phone]       [address]  [address]  [facility] [address]        Thank you!     Thank you for choosing [facility] [place]  for your care. Our goal is always to provide you with excellent care. Hearing back from our patients is one way " we can continue to improve our services. Please take a few minutes to complete the written survey that you may receive in the mail after your visit with us. Thank you!             Your Updated Medication List - Protect others around you: Learn how to safely use, store and throw away your medicines at www.disposemymeds.org.          This list is accurate as of: 2/3/17  3:16 PM.  Always use your most recent med list.                   Brand Name Dispense Instructions for use    ammonium lactate 12 % cream    AMLACTIN     Apply topically daily as needed       aspirin 81 MG EC tablet     30 tablet    Take 1 tablet (81 mg) by mouth daily       atorvastatin 40 MG tablet    LIPITOR    90 tablet    Take 1 tablet (40 mg) by mouth daily       clopidogrel 75 MG tablet    PLAVIX    30 tablet    Take 1 tablet (75 mg) by mouth daily       estrogens (conjugated) 0.3 MG tablet    PREMARIN    90 tablet    Take 1 tablet (0.3 mg) by mouth daily       lisinopril 5 MG tablet    PRINIVIL/ZESTRIL    30 tablet    Take 1 tablet (5 mg) by mouth daily       metoprolol 25 MG 24 hr tablet    TOPROL-XL    45 tablet    Take 0.5 tablets (12.5 mg) by mouth daily       multivitamin, therapeutic with minerals Tabs tablet      Take 1 tablet by mouth daily       vitamin B complex with vitamin C Tabs tablet      Take 1 tablet by mouth daily       VITAMIN E NATURAL PO      Take 400 Units by mouth daily

## 2017-02-04 NOTE — PROGRESS NOTES
HISTORY OF PRESENT ILLNESS:  Mrs. Bradshaw returns for followup of coronary artery disease.  In , she had a non-Q-wave myocardial infarction with mild left ventricular systolic dysfunction.  The culprit artery was the LAD.  She had percutaneous revascularization of the LAD and second diagonal, after which her wall motion abnormality resolved and there was normalization of overall left ventricular systolic function.  When I last saw her in 2016, she was doing well.  At the end of , she was admitted with recurrent chest discomfort and her troponin peak was 12.  At angiography, she was now found to have a tight stenosis involving her right PDA.  This was successfully angioplastied and stented.  She is now on Plavix, which she tolerates well.      She did have what sounded like a radial hematoma, but this is completely resolved.  She is well with no recurrence of any anginal symptoms.  Pulses are good in her wrists.      PHYSICAL EXAMINATION:  Cardiovascular system examination is normal.  She feels well with no recurrence of anginal-type symptoms.      IMPRESSION:   1.  Stable coronary artery disease.  With her history, I think there would be a good case for continuing with dual antiplatelet therapy indefinitely, especially in the absence of any significant bleeding issues.   2.  Dyslipidemia.  On moderate-dose statin.  Last LDL was in the 40s, which is excellent.   3.  Hypertension.  Under good control.        She is stable.  I would like to see her again in 6 months' time for further followup.         AVA MORALES MD, [facility]             D: 2017 15:36   T: 2017 03:39   MT: LEO      Name:     MAKENNA BRADSHAW   MRN:      [id number]        Account:      [id number]   :      1935           Service Date: 2017      Document: [id number]

## 2017-02-06 ENCOUNTER — HOSPITAL ENCOUNTER (OUTPATIENT)
Dept: CARDIAC REHAB | Facility: CLINIC | Age: 82
End: 2017-02-06
Attending: NURSE PRACTITIONER
Payer: COMMERCIAL

## 2017-02-06 PROCEDURE — 40000116 ZZH STATISTIC OP CR VISIT: Performed by: OCCUPATIONAL THERAPIST

## 2017-02-06 PROCEDURE — 93798 PHYS/QHP OP CAR RHAB W/ECG: CPT | Performed by: OCCUPATIONAL THERAPIST

## 2017-02-08 ENCOUNTER — HOSPITAL ENCOUNTER (OUTPATIENT)
Dept: CARDIAC REHAB | Facility: CLINIC | Age: 82
End: 2017-02-08
Attending: NURSE PRACTITIONER
Payer: COMMERCIAL

## 2017-02-08 PROCEDURE — 40000116 ZZH STATISTIC OP CR VISIT: Performed by: REHABILITATION PRACTITIONER

## 2017-02-08 PROCEDURE — 93798 PHYS/QHP OP CAR RHAB W/ECG: CPT | Performed by: REHABILITATION PRACTITIONER

## 2017-02-15 ENCOUNTER — HOSPITAL ENCOUNTER (OUTPATIENT)
Dept: CARDIAC REHAB | Facility: CLINIC | Age: 82
End: 2017-02-15
Attending: NURSE PRACTITIONER
Payer: COMMERCIAL

## 2017-02-15 PROCEDURE — 93798 PHYS/QHP OP CAR RHAB W/ECG: CPT

## 2017-02-15 PROCEDURE — 40000116 ZZH STATISTIC OP CR VISIT

## 2017-02-17 ENCOUNTER — HOSPITAL ENCOUNTER (OUTPATIENT)
Dept: CARDIAC REHAB | Facility: CLINIC | Age: 82
End: 2017-02-17
Attending: NURSE PRACTITIONER
Payer: COMMERCIAL

## 2017-02-17 PROCEDURE — 40000116 ZZH STATISTIC OP CR VISIT: Performed by: OCCUPATIONAL THERAPIST

## 2017-02-17 PROCEDURE — 93798 PHYS/QHP OP CAR RHAB W/ECG: CPT | Mod: KX | Performed by: OCCUPATIONAL THERAPIST

## 2017-02-17 ASSESSMENT — 6 MINUTE WALK TEST (6MWT)
GENDER SELECTION: FEMALE
TOTAL DISTANCE WALKED (FT): 1270

## 2017-02-22 ENCOUNTER — HOSPITAL ENCOUNTER (OUTPATIENT)
Dept: CARDIAC REHAB | Facility: CLINIC | Age: 82
End: 2017-02-22
Attending: NURSE PRACTITIONER
Payer: COMMERCIAL

## 2017-02-22 VITALS — WEIGHT: 122 LBS | HEIGHT: 62 IN | BODY MASS INDEX: 22.45 KG/M2

## 2017-02-22 PROCEDURE — 93798 PHYS/QHP OP CAR RHAB W/ECG: CPT | Mod: KX | Performed by: OCCUPATIONAL THERAPIST

## 2017-02-22 PROCEDURE — 40000575 ZZH STATISTIC OP CARDIAC VISIT #2: Performed by: OCCUPATIONAL THERAPIST

## 2017-02-22 PROCEDURE — 40000116 ZZH STATISTIC OP CR VISIT: Performed by: OCCUPATIONAL THERAPIST

## 2017-02-22 PROCEDURE — 93797 PHYS/QHP OP CAR RHAB WO ECG: CPT | Mod: KX | Performed by: OCCUPATIONAL THERAPIST

## 2017-02-22 ASSESSMENT — 6 MINUTE WALK TEST (6MWT)
TOTAL DISTANCE WALKED (FT): 1270
PREDICTED: 1255.9
MALE CALC: 1248.29
GENDER SELECTION: FEMALE
TOTAL DISTANCE WALKED (FT): 1380
FEMALE CALC: 1327.02

## 2017-02-22 NOTE — PROGRESS NOTES
02/22/17 1300   Session  Charlotte Bradshaw  Stent   Session Discharge Note   Certified through this date 03/31/17   Cardiac Rehab Assessment  Physician cosignature/electronic signature indicates agreements with the ITP document and approval of discharge.   Cardiac Rehab Assessment.crdis Charlotte Bradshaw is a 81 year old female with a past medical history of CAD with NSTEMI in 5/2015 and was admittted on 11/21/2016 with chest painand pressure. Charlotte has a history of situational depression from the loss of sigrid daughter and recently found out that her daughter in law has cancer.   She is reluctant to participate in cardiac rehab but understand that it will be a benefit to her health.   11/30  Patient did have 30 day ITP completed today.  12/28 Reviewed progress with Pt today. She has been participating only 1 x week due to the stress of the holidays, but plans to increase up to 3 x week after the New Year. She is progressing slowly, and appears to be actively grieving and easily brought to tears. She affirmed the goals she had made, and is planning to meet in a private consult to work on healthy grieving skills, and may follow up with the program . 1/6 Patient spoke to therapist about her grief and her daughter.  Therapist has known patient for several years.  Previously, patient could not talk without crying.  Now she only cries when her daughter is brought up directly.  She  is making progress in her grief, but does not want people to bring it up to her in rehab.  She understands the consequences of unresolved grief, but feels that she is not being seen or heard except for her grief. She did not find her previous consult with  helpful and declined another visit.  She is willing to look in general at stress and how to handle it.  She still is exploring where and when to exercise.  She was given information on the [facility] program and encouraged to check out [employer] too.She continues to benefit from skilled  services for progressive, monitored exercise, targeted education and behavior change counseling when indicated. Pt is also benefitting from skilled emotional support.    1/20 Patient continues to make good progress in rehab with normal CV responses.  She is pleased with her progress.  Patient continues to benefit from skilled intervention for progressive monitor exercise and assistance in reaching her goal of maintaining home exercise and stress manaement.     2./22 Pt is pleased with her progress in rehab, and she and her  have now signed up to join the [facility] program at [facility].  Pt made significant gains in exercise tolerance. Initially patient tolerated 30 minutes at 2.7 METs, now tolerating 35 minutes at 5.8 METs. Patient also increased 6-minute walk test by 9% (an increase of 110 feet.) The PT was given instructions on frequency, intensity, and duration for continued exercise as well as muscle conditioning and stretching exercises.  Your PT plans to continue as mentioned above.     General Information   Treatment Diagnosis Stent   Date of Treatment Diagnosis 11/22/16   Secondary Treatment Diagnosis Stent   Significant Past CV History Previous PCI;Clinical significant depression or depressive symptoms   Comorbidities Previous MI   Other Medical History .pmh   Lead up symptoms Chest Pressure   Onset of Symptoms 11/22/16   Hospital Location [facility] Discharge Date 11/23/16   Signs and Symptoms Post Hospital Discharge Anxiety   Outpatient Cardiac Rehab Start Date 11/28/16   Primary Physician Dr. Soni Blackwell   Primary Physician Follow Up NA   Surgeon Melvin Rodriguez MD   Surgeon Follow Up Scheduled  (12/5)   Cardiologist Dr. Dickson   Cardiologist Follow Up Scheduled  (2/3)   Ejection Fraction 60%   Risk Stratification Low   Summary of Cath Report   Summary of Cath Report Available   Date Performed 11/21/16   Left Main Left Main (LM) large caliber vessel with no angiographic  "evidence   LAD Left Anterior Descending (LAD): large caliber type 3 vessel which   LCX There is moderate focal disease with 50 % stenosis in the mid   RCA large caliber vessel which gives rise   Living and Work Status    Living Arrangements and Social Status house   Support System Live with an adult   Return to Employment Retired   Occupation Walling   Preventative Medications   CMS recommended medications Ace inhibitors;Antiplatelets;Beta Blocker;Estrogen;Lipid Lowering   Falls Screen   Have you fallen two or more times in the past year? No   Pain   Patient Currently in Pain No   Physical Assessments   Incisions WNL   Edema Not assessed   Right Lung Sounds not assessed   Left Lung Sounds not assessed   Individualized Treatment Plan   Monitored Sessions Scheduled 28   Monitored Sessions Attended 23   Oxygen   Supplemental Oxygen needed No   Nutrition Management - Weight Management   Assessment Discharge   Age 81   Weight 55.3 kg (122 lb)   Height 1.577 m (5' 2.09\")   BMI (Calculated) 22.3   Initial Rate Your Plate Score. Dietary tool to assess eating patterns. Scores range from 24 to 72. The higher the score the healthier the eating pattern. 54   Discharge Rate Your Plate Score 55   Weight Management Comments Pt is at a healthy weight    Nutrition Management - Lipids   Lipids Labs Available  (no new lipid panel )   Date 08/05/15   Total Cholesterol 117   Triglycerides 52   HDL 63   LDL 44   Nutrition Management - Diabetes   Diabetes No   Nutrition Management Summary   Dietary Recommendations Low Fat;Low Cholesterol;Low Sodium   Stages of Change for Diet Compliance Action   Nutrition Summary Comments Pt is currently at a healthy weights   Psychosocial Management   Psychosocial Assessment Discharge   Is there history of clinical depression or increased risk of depression? History of clinical depression   Current Level of Stress per Patient Report Moderate    Current Coping Skills Patient Unable to Identify Personal " "Coping Skills   Initial Patient Health Questionnaire -9 Score (PHQ-9) for depression. 5-9 Minimal symptoms, 10-14 Minor depression, 15-19 Major depression, moderately severe, > 20 Major depression, severe  5   Discharge PHQ-9 Score for Depression 0   Initial [facility] Survey score.  Quality of Life:   If total score > 25 review individual areas where patient rated a 4 or 5.  Consider patients current medical condition and what role that plays on the score.   Adjust treatment protocol to improve areas of concern.  Consider the following:  PHQ9 score, DASI, and re-assessment within the next 30 days to assist with developing treatments.  24   [facility] Score 15   Interventions Planned Patient to verbalize understanding of behavioral assessment results;Patient to verbalize understanding of negative impact of stress to personal health   Interventions In Progress or Completed Patient verbalizes understanding of behavioral assessment scores;Patient verbalizes understanding of negative impact of stress to personal health   Patient Goal Yes   Goal Description Pt will describe 3 positive ways to deal with stress  that she can do to ohelp take care of her.   Goal Target Date 12/28/16   Progress Towards Goal 12/28 Pt has met once before with program  and is considering it again. At this time, she has set up a private consult and would like to talk about healthy grieving, 1/6 Patient has decided she does not want people asking her about her grief anymore.  She is willing  to try to think of ways to handle stress, but does not want the constant mention of her daughter.  Patient does not feel that people are seeing any other aspect of her and may not be hearing her other concerns.  She did agree to let therapist give her a daily meditation book for people dealing with loss. 2/17 Pt has \"re-discovered\" her hobby of doing Finnish hardanger, a needlework hobby that requires a lot of concentration. " 2/22 Pt feels she is handling her stress well at this time, and is enjoying having started her old hobby.    Psychosocial Comments Pt lost daughter 4.5 years ago and has learned taht daughter in law has cancer.   Other Core Components - Hypertension   History of or Diagnosis of Hypertension No   Currently taking Anti-Hypertensives Yes;Beta blocker;Ace Inhibitor   Hypertension Comments 2/22 Resting BP well controlled.  Exercise response is marginally high and recovers well.    Other Core Components - Tobacco   History of Tobacco Use Never   Other Core Components Summary   Interventions Planned Other (see comments);Educate on importance of monitoring daily weight  (Check in and offer support for stress and depression )   Activity/Exercise History   Activity/Exercise Assessment Discharge   Activity/Exercise Status prior to event? Was Physically Active;Participated in an Exercise Program   Number of Days Currently participating in Moderate Physical Activity? 7   Number of Days Currently performing  Aerobic Exercise (including rehab)? 3   Number of Minutes per Session Currently of Aerobic Exercise (average)? 30-40   Patient Goals Goal #1;Goal #2   Goal #1 Description Pt will find a  by the end of the year or purchase a gentle yoga DVD.   Goal #1 Target Date 01/02/17   Goal #1 Date Met 02/22/17   Goal #1 Progress Towards Goal 12/28 within the next 2 week., Pt will check out current class offerings, and the costs. She would like to start by end of January. 1/6 Patient hasn't found any yoga tapes yet.  Talked with her about using library tapes.  Sheis interested in [employer] and is going to see if she can visit a location before joining. 2/17 Pt has decided on attending a [employer] program with her , that is at [facility] to her home.  2/22 Pt has actually joined the program.    Goal #2 Description Pt will demostarted that she is able to complete 45 minutes of moderate  aerobic exercise with a normal CV response to that she can restart the [facility] program.   Goal #2 Target Date 01/12/17   Goal #2 Date Met 02/22/17   Goal #2 Progress Towards Goal 12/28 Pt will start by beginning indoor walking after the New Year holiday.  1/6 Patient has not started walking yet , but she is willing to be more actively seeking a place to exercise.  she said she will bring her treadmill out to use.   1/20 Patient continues to consider joinging the [facility] program.  Discussed the benefits and details of the program.  Patient appears to be leaning to joining the program with her . 2/17 Pt is comfortably exercising for 35 min in rehab and feels confident that she will be able to increase.   2/22 Pt is comfortably doing a 5.8 MET level in rehab, fully adequate for the [facility]EL program .   Exercise Assessment   6 Minute Walk Predicted - Gender Selection Female   6 Minute Walk Predicted (Male) 1248.29   6 Minute Walk Predicted (Female) 1327.02   Initial 6 Minute Walk Distance (Feet) 1270 ft   Discharge 6 Minute Walk Distance (Feet) 1380   Resting HR 63 bpm   Exercise HR 94 bpm   Post Exercise HR 59 bpm   Resting /58   Exercise /58   Post Exercise /60   Effort Rating 6   Current MET Level 5.8   MET Level Goal 5-6   ECG Rhythm Sinus bradycardia   Ectopy None   Current Symptoms Denies symptoms   Exercise Prescription   Mode Treadmill;Nustep;Airdyne;Ambulation;Weights   Duration/Time 30-45 min   Frequency 3 daysweek   THR (85% of age predicted max HR) 118.15   OMNI Effort Rating (0-10 Scale) 4-6/10   Progression Continuous bouts;Total exercise time of 30-45 minutes;Progress peak intensity by 1/2 MET per week   Recommended Home Exercise   Type of Exercise Walking;Health club;Weights;Low Impact Calisthenics   Frequency (days per week) 3-4   Duration (minutes per session) 30-45 min   Effort Rating Recommended 4-6/10   30 Day Exercise Plan Pt will start and maintain a regular walking program at home,  and return to the [facility] program if desired.    Current Home Exercise   Type of Exercise None  (will start 3 x week after discharge. )   Follow-up/On-going Support   Provider follow-up needed on the following No follow-up needed   Learning Assessment   Learner Patient   Primary Language English   Preferred Learning Style Reading   Barriers to Learning No barriers noted   Patient Education   Education recommended Exercise Principles;Stress Management   Education classes attended Patient Attended Education in the Past;Exercise Principles;Medication Overview  (private consult on 1/6/17)   Follow-up/On-going Support   Provider follow-up needed on the following No follow-up needed   Living and Work Status   Living Arrangements house   ADL Limitations None   Occupation Volunteer

## 2017-03-08 ENCOUNTER — TRANSFERRED RECORDS (OUTPATIENT)
Dept: HEALTH INFORMATION MANAGEMENT | Facility: CLINIC | Age: 82
End: 2017-03-08

## 2017-03-08 ENCOUNTER — OFFICE VISIT (OUTPATIENT)
Dept: OBGYN | Facility: CLINIC | Age: 82
End: 2017-03-08
Payer: COMMERCIAL

## 2017-03-08 VITALS
SYSTOLIC BLOOD PRESSURE: 132 MMHG | DIASTOLIC BLOOD PRESSURE: 60 MMHG | HEART RATE: 64 BPM | BODY MASS INDEX: 22.45 KG/M2 | WEIGHT: 122 LBS | HEIGHT: 62 IN

## 2017-03-08 DIAGNOSIS — Z79.890 POST-MENOPAUSE ON HRT (HORMONE REPLACEMENT THERAPY): ICD-10-CM

## 2017-03-08 DIAGNOSIS — Z01.419 ENCOUNTER FOR GYNECOLOGICAL EXAMINATION WITHOUT ABNORMAL FINDING: Primary | ICD-10-CM

## 2017-03-08 DIAGNOSIS — Z12.31 ENCOUNTER FOR SCREENING MAMMOGRAM FOR MALIGNANT NEOPLASM OF BREAST: ICD-10-CM

## 2017-03-08 PROCEDURE — G0101 CA SCREEN;PELVIC/BREAST EXAM: HCPCS | Performed by: OBSTETRICS & GYNECOLOGY

## 2017-03-08 RX ORDER — PREDNISOLONE ACETATE 10 MG/ML
1 SUSPENSION/ DROPS OPHTHALMIC
COMMUNITY
Start: 2015-05-26 | End: 2017-03-08

## 2017-03-08 RX ORDER — ESCITALOPRAM OXALATE 10 MG/1
10 TABLET ORAL
COMMUNITY
Start: 2015-04-17 | End: 2017-03-08

## 2017-03-08 RX ORDER — DIPHENOXYLATE HYDROCHLORIDE AND ATROPINE SULFATE 2.5; .025 MG/1; MG/1
1 TABLET ORAL
COMMUNITY
Start: 2015-05-26 | End: 2023-03-08

## 2017-03-08 RX ORDER — LISINOPRIL 2.5 MG/1
TABLET ORAL
Refills: 2 | COMMUNITY
Start: 2016-12-20 | End: 2017-03-08 | Stop reason: DRUGHIGH

## 2017-03-08 RX ORDER — PAROXETINE 7.5 MG/1
CAPSULE ORAL
COMMUNITY
End: 2017-03-08

## 2017-03-08 RX ORDER — B-COMPLEX WITH VITAMIN C
TABLET ORAL
COMMUNITY
Start: 2015-05-26 | End: 2019-01-24 | Stop reason: ALTCHOICE

## 2017-03-08 RX ORDER — TICAGRELOR 90 MG/1
TABLET ORAL
Refills: 3 | COMMUNITY
Start: 2016-08-31 | End: 2017-03-08

## 2017-03-08 RX ORDER — METOPROLOL TARTRATE 50 MG
50 TABLET ORAL
COMMUNITY
End: 2017-03-08

## 2017-03-08 ASSESSMENT — ANXIETY QUESTIONNAIRES
3. WORRYING TOO MUCH ABOUT DIFFERENT THINGS: NOT AT ALL
GAD7 TOTAL SCORE: 1
6. BECOMING EASILY ANNOYED OR IRRITABLE: NOT AT ALL
1. FEELING NERVOUS, ANXIOUS, OR ON EDGE: SEVERAL DAYS
5. BEING SO RESTLESS THAT IT IS HARD TO SIT STILL: NOT AT ALL
7. FEELING AFRAID AS IF SOMETHING AWFUL MIGHT HAPPEN: NOT AT ALL
2. NOT BEING ABLE TO STOP OR CONTROL WORRYING: NOT AT ALL

## 2017-03-08 ASSESSMENT — PATIENT HEALTH QUESTIONNAIRE - PHQ9: 5. POOR APPETITE OR OVEREATING: NOT AT ALL

## 2017-03-08 NOTE — MR AVS SNAPSHOT
"              After Visit Summary   3/8/2017    Charlotte Bradshaw    MRN: [id number]           Patient Information     Date Of Birth          1935        Visit Information        Provider Department      3/8/2017 11:30 AM Kirstie Rivera MD [facility]        Today's Diagnoses     Encounter for gynecological examination without abnormal finding    -  1    Encounter for screening mammogram for malignant neoplasm of breast        Post-menopause on HRT (hormone replacement therapy)           Follow-ups after your visit        Additional Services     RADIOLOGY REFERRAL ([facility])       You have been referred to [facility] for Mammogram (Screening) Imaging.  They are located across the simental from the [facility] (same building).  [address], [address]  Phone: [phone].                  Who to contact     If you have questions or need follow up information about today's clinic visit or your schedule please contact [facility] directly at [phone].  Normal or non-critical lab and imaging results will be communicated to you by MyChart, letter or phone within 4 business days after the clinic has received the results. If you do not hear from us within 7 days, please contact the clinic through [employer] or phone. If you have a critical or abnormal lab result, we will notify you by phone as soon as possible.  Submit refill requests through Intapp or call your pharmacy and they will forward the refill request to us. Please allow 3 business days for your refill to be completed.          Additional Information About Your Visit        Zigabid[employer] Information     In[employer]pp lets you send messages to your doctor, view your test results, renew your prescriptions, schedule appointments and more. To sign up, go to [email or web address] . Click on \"Log in\" on the left side of the screen, which will take you to the Welcome page. Then click on \"Sign up Now\" on " "the right side of the page.     You will be asked to enter the access code listed below, as well as some personal information. Please follow the directions to create your username and password.     Your access code is: [email or web address]  Expires: 2017 12:25 PM     Your access code will  in 90 days. If you need help or a new code, please call your [facility] clinic or [phone].        Care EveryWhere ID     This is your Care EveryWhere ID. This could be used by other organizations to access your [facility] medical records  [id number]        Your Vitals Were     Pulse Height BMI (Body Mass Index)             64 5' 2\" (1.575 m) 22.31 kg/m2          Blood Pressure from Last 3 Encounters:   17 132/60   17 126/62   16 120/80    Weight from Last 3 Encounters:   17 122 lb (55.3 kg)   17 122 lb (55.3 kg)   17 122 lb 14.4 oz (55.7 kg)              We Performed the Following     Medicare Limited Visit     RADIOLOGY REFERRAL ([facility])          Today's Medication Changes          These changes are accurate as of: 3/8/17 12:25 PM.  If you have any questions, ask your nurse or doctor.               These medicines have changed or have updated prescriptions.        Dose/Directions    estrogens (conjugated) 0.3 MG tablet   Commonly known as:  PREMARIN   This may have changed:  additional instructions   Used for:  Post-menopause on HRT (hormone replacement therapy)        Dose:  0.3 mg   Take 1 tablet (0.3 mg) by mouth daily   Quantity:  90 tablet   Refills:  3       lisinopril 5 MG tablet   Commonly known as:  PRINIVIL/ZESTRIL   This may have changed:  Another medication with the same name was removed. Continue taking this medication, and follow the directions you see here.   Used for:  NSTEMI (non-ST elevated myocardial infarction) (H)        Dose:  5 mg   Take 1 tablet (5 mg) by mouth daily   Quantity:  30 tablet   Refills:  3            Where to get your medicines      Some of these " will need a paper prescription and others can be bought over the counter.  Ask your nurse if you have questions.     Bring a paper prescription for each of these medications     estrogens (conjugated) 0.3 MG tablet                Primary Care Provider Office Phone # Fax #    Soni Blackwell [phone]       [email or web address]  [address]  [facility] [address]        Thank you!     Thank you for choosing [facility] [place]  for your care. Our goal is always to provide you with excellent care. Hearing back from our patients is one way we can continue to improve our services. Please take a few minutes to complete the written survey that you may receive in the mail after your visit with us. Thank you!             Your Updated Medication List - Protect others around you: Learn how to safely use, store and throw away your medicines at www.disposemymeds.org.          This list is accurate as of: 3/8/17 12:25 PM.  Always use your most recent med list.                   Brand Name Dispense Instructions for use    ammonium lactate 12 % cream    AMLACTIN     Apply topically daily as needed       aspirin 81 MG EC tablet     30 tablet    Take 1 tablet (81 mg) by mouth daily       atorvastatin 40 MG tablet    LIPITOR    90 tablet    Take 1 tablet (40 mg) by mouth daily       clopidogrel 75 MG tablet    PLAVIX    30 tablet    Take 1 tablet (75 mg) by mouth daily       estrogens (conjugated) 0.3 MG tablet    PREMARIN    90 tablet    Take 1 tablet (0.3 mg) by mouth daily       lisinopril 5 MG tablet    PRINIVIL/ZESTRIL    30 tablet    Take 1 tablet (5 mg) by mouth daily       metoprolol 25 MG 24 hr tablet    TOPROL-XL    45 tablet    Take 0.5 tablets (12.5 mg) by mouth daily       MULTI-VITAMINS Tabs      Take 1 tablet by mouth       multivitamin, therapeutic with minerals Tabs tablet      Take 1 tablet by mouth daily       vitamin B complex with vitamin C Tabs tablet      Take 1 tablet by mouth daily        VITAMIN B COMPLEX-C Caps          VITAMIN E NATURAL PO      Take 400 Units by mouth daily

## 2017-03-08 NOTE — PROGRESS NOTES
Charlotte is a 81 year old  female who presents for Medicare Limited exam.     Do you have a Health Care Directive?: Yes: Patient states has Advance Directive and will bring in a copy to clinic.    Fall risk:   Fallen 2 or more times in the past year?: No  Any fall with injury in the past year?: No    HPI :  Patient has stents, had another MI  Cardiologist says she can stay on her low dose premarin 0.3 if she wishes despite the cardiac issues  Patient takes it occ as she is trying to reduce cost  Not on progestin due to low dose and infrequent use  Vagina obliterated, can't do exam  Gets yearly mammo and visit with us due to hrt    GYNECOLOGIC HISTORY:  No LMP recorded. Patient is postmenopausal..   reports that she has never smoked. She has never used smokeless tobacco.    STD testing offered?  Declined  Last PHQ-9 score on record=   PHQ-9 SCORE 3/8/2017   Total Score 0     Last GAD7 score on record=   SCOOBY-7 SCORE 3/7/2016 3/8/2017   Total Score 6 1       HEALTH MAINTENANCE:  Cholesterol: (  Cholesterol   Date Value Ref Range Status   2015 117 0 - 200 mg/dL Final     Comment:     LDL Cholesterol is the primary guide to therapy.   The NCEP recommends further evaluation of: patients with cholesterol greater   than 200 mg/dL if additional risk factors are present, cholesterol greater   than   240 mg/dL, triglycerides greater than 150 mg/dL, or HDL less than 40 mg/dL.     2015 177 <200 mg/dL Final     Comment:     LDL Cholesterol is the primary guide to therapy.   The NCEP recommends further evaluation of: patients with cholesterol greater   than 200 mg/dL if additional risk factors are present, cholesterol greater   than   240 mg/dL, triglycerides greater than 150 mg/dL, or HDL less than 40 mg/dL.        Last Mammo: one year ago, Result: normal, Next Mammo: today at [facility]  Pap: 2009  DEXA:    Colonoscopy:  2006, Result:  normal, Next Colonoscopy: nothing further.    HISTORY:  Obstetric History    G2    P2   T2      TAB0   SAB0   E0   M0   L2       # Outcome Date GA Lbr Zeeshan/2nd Weight Sex Delivery Anes PTL Lv   2 Term         Y   1 Term         Y        Past Medical History   Diagnosis Date     CAD (coronary artery disease) 2015     Left Heart cath - 90% stenosis mid LAD - PTCA/birfurcation stenting with MICKIE of mid LAD and 2nd diagonal branch placed and 100% occlusion 2nd diagonal branch - PTCA/MICKIE, 2015 EF 40-45% by Echo     Carcinoma in situ of skin      Depression      Hormone replacement therapy      she wants to continue HRT even though she is aware that it increases her lifetime risk of breast cancer.     Ischemic cardiomyopathy      Menopausal syndrome      NSTEMI (non-ST elevated myocardial infarction) (H) 2015     Osteoporosis 2007     Shortness of breath      Past Surgical History   Procedure Laterality Date     Biopsy of skin lesion       Orthopedic surgery  2006     hip replacement     Heart cath left heart cath  2015     MICKIE to second diagonal branch, bifurcation stent to Mid LAD. Severe 90% stenosis in mid LAD and 100% occlusion of second diagonal branch     Family History   Problem Relation Age of Onset     CANCER Mother      Coronary Artery Disease Father 77     MI     Breast Cancer Daughter 53     daughter has stage IV breast cancer at age 53     Social History     Social History     Marital status:      Spouse name: N/A     Number of children: 2     Years of education: N/A     Occupational History     Retired      Social History Main Topics     Smoking status: Never Smoker     Smokeless tobacco: Never Used     Alcohol use 0.0 oz/week     0 Standard drinks or equivalent per week      Comment: Rare     Drug use: No     Sexual activity: Not Currently     Partners: Male     Birth control/ protection: Post-menopausal     Other Topics Concern     Parent/Sibling W/ Cabg, Mi Or Angioplasty Before 65f 55m? No     Caffeine Concern No     no caffeine     Sleep Concern No      "Stress Concern No     Special Diet No     Exercise Yes     walking dog, 2 story house, folk dancing     Seat Belt Yes     Social History Narrative     Current Outpatient Prescriptions   Medication Sig     VITAMIN B COMPLEX-C CAPS      Multiple Vitamin (MULTI-VITAMINS) TABS Take 1 tablet by mouth     clopidogrel (PLAVIX) 75 MG tablet Take 1 tablet (75 mg) by mouth daily     lisinopril (PRINIVIL,ZESTRIL) 5 MG tablet Take 1 tablet (5 mg) by mouth daily     metoprolol (TOPROL-XL) 25 MG 24 hr tablet Take 0.5 tablets (12.5 mg) by mouth daily     atorvastatin (LIPITOR) 40 MG tablet Take 1 tablet (40 mg) by mouth daily     estrogens, conjugated, (PREMARIN) 0.3 MG tablet Take 1 tablet (0.3 mg) by mouth daily (Patient taking differently: Take 0.3 mg by mouth daily Rxed as daily but takes every 4th day or so)     ammonium lactate (AMLACTIN) 12 % cream Apply topically daily as needed      aspirin EC 81 MG EC tablet Take 1 tablet (81 mg) by mouth daily     multivitamin, therapeutic with minerals (THERA-VIT-M) TABS Take 1 tablet by mouth daily     vitamin  B complex with vitamin C (VITAMIN  B COMPLEX) TABS Take 1 tablet by mouth daily     VITAMIN E NATURAL PO Take 400 Units by mouth daily     [DISCONTINUED] lisinopril (PRINIVIL/ZESTRIL) 2.5 MG tablet      No current facility-administered medications for this visit.      Allergies   Allergen Reactions     Codeine Camsylate Other (See Comments)     \"I get loopy\"       Past medical, surgical, social and family history were reviewed and updated in [facility].    EXAM:  /60  Pulse 64  Ht 5' 2\" (1.575 m)  Wt 122 lb (55.3 kg)  BMI 22.31 kg/m2   BMI: Body mass index is 22.31 kg/(m^2).    Constitutional: Appearance: Well nourished, well developed alert, in no acute distress  Breasts: Inspection of Breasts:  No lymphadenopathy present    Palpation of Breasts and Axillae:  No masses present on palpation, no  breast tenderness    Axillary Lymph Nodes:  No lymphadenopathy " present  Neurologic/Psychiatric:    Mental Status:  Oriented X3     Pelvic Exam:  External Genitalia:     Normal appearance for age, no discharge present, no tenderness present, no inflammatory lesions present, color normal  Vagina:     Normal vaginal vault without central or paravaginal defects, ATROPHIC  Bladder:     Nontender to palpation  Urethra:   Urethral Body:  Urethra palpation normal, urethra structural support normal   Urethral Meatus:  No erythema or lesions present  Can not do internal pelvic exam  Perineum:     Perineum within normal limits, no evidence of trauma, no rashes or skin lesions present  Inguinal Lymph Nodes:     No lymphadenopathy present      Body mass index is 22.31 kg/(m^2).     reports that she has never smoked. She has never used smokeless tobacco.      ASSESSMENT:  81 year old female with satisfactory annual exam.    ICD-10-CM    1. Encounter for gynecological examination without abnormal finding Z01.419        COUNSELING:   Reviewed preventive health counseling, as reflected in patient instructions       risks of hrt in her case    PLAN/PATIENT INSTRUCTIONS:    There are no Patient Instructions on file for this visit.    Kirstie Rivera MD

## 2017-03-09 ASSESSMENT — PATIENT HEALTH QUESTIONNAIRE - PHQ9: SUM OF ALL RESPONSES TO PHQ QUESTIONS 1-9: 0

## 2017-03-09 ASSESSMENT — ANXIETY QUESTIONNAIRES: GAD7 TOTAL SCORE: 1

## 2017-04-17 DIAGNOSIS — I24.9 ACS (ACUTE CORONARY SYNDROME) (H): ICD-10-CM

## 2017-04-17 RX ORDER — ATORVASTATIN CALCIUM 40 MG/1
40 TABLET, FILM COATED ORAL DAILY
Qty: 90 TABLET | Refills: 2 | Status: SHIPPED | OUTPATIENT
Start: 2017-04-17 | End: 2018-02-07

## 2017-04-21 DIAGNOSIS — I21.4 NSTEMI (NON-ST ELEVATED MYOCARDIAL INFARCTION) (H): ICD-10-CM

## 2017-04-21 RX ORDER — LISINOPRIL 5 MG/1
5 TABLET ORAL DAILY
Qty: 90 TABLET | Refills: 3 | Status: SHIPPED | OUTPATIENT
Start: 2017-04-21 | End: 2018-05-09

## 2017-07-28 DIAGNOSIS — I21.4 NSTEMI (NON-ST ELEVATED MYOCARDIAL INFARCTION) (H): ICD-10-CM

## 2017-07-28 RX ORDER — CLOPIDOGREL BISULFATE 75 MG/1
75 TABLET ORAL DAILY
Qty: 90 TABLET | Refills: 2 | Status: SHIPPED | OUTPATIENT
Start: 2017-07-28 | End: 2018-03-14

## 2017-08-14 DIAGNOSIS — I21.4 NSTEMI (NON-ST ELEVATED MYOCARDIAL INFARCTION) (H): ICD-10-CM

## 2017-08-14 RX ORDER — METOPROLOL SUCCINATE 25 MG/1
12.5 TABLET, EXTENDED RELEASE ORAL DAILY
Qty: 45 TABLET | Refills: 2 | Status: SHIPPED | OUTPATIENT
Start: 2017-08-14 | End: 2018-05-09

## 2017-08-18 ENCOUNTER — PRE VISIT (OUTPATIENT)
Dept: CARDIOLOGY | Facility: CLINIC | Age: 82
End: 2017-08-18

## 2017-08-22 ENCOUNTER — OFFICE VISIT (OUTPATIENT)
Dept: CARDIOLOGY | Facility: CLINIC | Age: 82
End: 2017-08-22
Attending: INTERNAL MEDICINE
Payer: COMMERCIAL

## 2017-08-22 VITALS
BODY MASS INDEX: 22.31 KG/M2 | HEIGHT: 62 IN | WEIGHT: 121.2 LBS | HEART RATE: 62 BPM | SYSTOLIC BLOOD PRESSURE: 132 MMHG | DIASTOLIC BLOOD PRESSURE: 62 MMHG

## 2017-08-22 DIAGNOSIS — I25.5 ISCHEMIC CARDIOMYOPATHY: ICD-10-CM

## 2017-08-22 DIAGNOSIS — I24.9 ACS (ACUTE CORONARY SYNDROME) (H): ICD-10-CM

## 2017-08-22 PROCEDURE — 99213 OFFICE O/P EST LOW 20 MIN: CPT | Performed by: INTERNAL MEDICINE

## 2017-08-22 NOTE — MR AVS SNAPSHOT
"              After Visit Summary   8/22/2017    Charlotte Bradshaw    MRN: [id number]           Patient Information     Date Of Birth          1935        Visit Information        Provider Department      8/22/2017 1:15 PM Abilio Razo MD [facility] [place]        Today's Diagnoses     ACS (acute coronary syndrome) (H)        Ischemic cardiomyopathy           Follow-ups after your visit        Additional Services     Follow-Up with Cardiologist                 Your next 10 appointments already scheduled     Mar 12, 2018 11:45 AM CDT   MA SCREENING DIGITAL BILATERAL with [email or web address]   [facility] [place] ([facility])    [address], [address]  [facility] [id number] [phone]           Do not use any powder, lotion or deodorant under your arms or on your breast. If you do, we will ask you to remove it before your exam.  Wear comfortable, two-piece clothing.  If you have any allergies, tell your care team.  Bring any previous mammograms from other facilities or have them mailed to the breast center. Three-dimensional (3D) mammograms are available at [place] locations in [facility], and [place]. [facility] locations include [place] and [facility] in [place]. Benefits of 3D mammograms include: - Improved rate of cancer detection - Decreases your chance of having to go back for more tests, which means fewer: - \"False-positive\" results (This means that there is an abnormal area but it isn't cancer.) - Invasive testing procedures, such as a biopsy or surgery - Can provide clearer images of the breast if you have dense breast tissue. 3D mammography is an optional exam that anyone can have with a 2D mammogram. It doesn't replace or take the place of a 2D mammogram. 2D mammograms remain an effective screening test for all women.  Not all insurance companies cover the cost of a 3D " "mammogram. Check with your insurance.            Mar 12, 2018 12:00 PM CDT   PHYSICAL with Kirstie Rivera MD   [facility] ([facility])    [address] [place] [id number] [phone]              Future tests that were ordered for you today     Open Future Orders        Priority Expected Expires Ordered    Follow-Up with Cardiologist Routine 2017            Who to contact     If you have questions or need follow up information about today's clinic visit or your schedule please contact [facility] [place] directly at [phone].  Normal or non-critical lab and imaging results will be communicated to you by MyChart, letter or phone within 4 business days after the clinic has received the results. If you do not hear from us within 7 days, please contact the clinic through z[facility]art or phone. If you have a critical or abnormal lab result, we will notify you by phone as soon as possible.  Submit refill requests through [employer] or call your pharmacy and they will forward the refill request to us. Please allow 3 business days for your refill to be completed.          Additional Information About Your Visit        zhouwuhart Information     [employer] lets you send messages to your doctor, view your test results, renew your prescriptions, schedule appointments and more. To sign up, go to [email or web address] . Click on \"Log in\" on the left side of the screen, which will take you to the Welcome page. Then click on \"Sign up Now\" on the right side of the page.     You will be asked to enter the access code listed below, as well as some personal information. Please follow the directions to create your username and password.     Your access code is: [email or web address]  Expires: 2017  1:42 PM     Your access code will  in 90 days. If you need help or a new code, please call your [place] clinic or " "[phone].        Care EveryWhere ID     This is your Care EveryWhere ID. This could be used by other organizations to access your [place] medical records  [id number]        Your Vitals Were     Pulse Height BMI (Body Mass Index)             62 1.575 m (5' 2\") 22.17 kg/m2          Blood Pressure from Last 3 Encounters:   08/22/17 132/62   03/08/17 132/60   02/03/17 126/62    Weight from Last 3 Encounters:   08/22/17 55 kg (121 lb 3.2 oz)   03/08/17 55.3 kg (122 lb)   02/22/17 55.3 kg (122 lb)              We Performed the Following     Follow-Up with Cardiologist          Today's Medication Changes          These changes are accurate as of: 8/22/17  1:42 PM.  If you have any questions, ask your nurse or doctor.               Stop taking these medicines if you haven't already. Please contact your care team if you have questions.     multivitamin, therapeutic with minerals Tabs tablet   Stopped by:  Abilio Razo MD           vitamin B complex with vitamin C Tabs tablet   Stopped by:  Abilio Razo MD                    Primary Care Provider Office Phone # Fax #    Soni Blackwell [phone]       [facility] [address]  [facility] [address]        Equal Access to Services     [facility]: Hadii hadley jules hadasho Soomaali, waaxda luqadaha, qaybta kaalmada adeegyada, galilea ford haymelanie pleitez . So [facility] [phone].    ATENCIÓN: Si habla español, tiene a concepcion disposición servicios gratuitos de asistencia lingüística. Llame al [phone].    We comply with applicable federal civil rights laws and [place] laws. We do not discriminate on the basis of race, color, national origin, age, disability sex, sexual orientation or gender identity.            Thank you!     Thank you for choosing [facility] [place]  for your care. Our goal is always to provide you with excellent care. Hearing back from our patients is one way we can continue " to improve our services. Please take a few minutes to complete the written survey that you may receive in the mail after your visit with us. Thank you!             Your Updated Medication List - Protect others around you: Learn how to safely use, store and throw away your medicines at www.disposemymeds.org.          This list is accurate as of: 8/22/17  1:42 PM.  Always use your most recent med list.                   Brand Name Dispense Instructions for use Diagnosis    ammonium lactate 12 % cream    AMLACTIN     Apply topically daily as needed        aspirin 81 MG EC tablet     30 tablet    Take 1 tablet (81 mg) by mouth daily    ACS (acute coronary syndrome) (H)       atorvastatin 40 MG tablet    LIPITOR    90 tablet    Take 1 tablet (40 mg) by mouth daily    ACS (acute coronary syndrome) (H)       clopidogrel 75 MG tablet    PLAVIX    90 tablet    Take 1 tablet (75 mg) by mouth daily    NSTEMI (non-ST elevated myocardial infarction) (H)       lisinopril 5 MG tablet    PRINIVIL/ZESTRIL    90 tablet    Take 1 tablet (5 mg) by mouth daily    NSTEMI (non-ST elevated myocardial infarction) (H)       metoprolol 25 MG 24 hr tablet    TOPROL-XL    45 tablet    Take 0.5 tablets (12.5 mg) by mouth daily    NSTEMI (non-ST elevated myocardial infarction) (H)       MULTI-VITAMINS Tabs      Take 1 tablet by mouth        VITAMIN B COMPLEX-C Caps           VITAMIN E NATURAL PO      Take 400 Units by mouth daily

## 2017-08-22 NOTE — PROGRESS NOTES
"HPI and Plan:   See dictation    Orders Placed This Encounter   Procedures     Follow-Up with Cardiologist       No orders of the defined types were placed in this encounter.      Encounter Diagnoses   Name Primary?     ACS (acute coronary syndrome) (H)      Ischemic cardiomyopathy        CURRENT MEDICATIONS:  Current Outpatient Prescriptions   Medication Sig Dispense Refill     metoprolol (TOPROL-XL) 25 MG 24 hr tablet Take 0.5 tablets (12.5 mg) by mouth daily 45 tablet 2     clopidogrel (PLAVIX) 75 MG tablet Take 1 tablet (75 mg) by mouth daily 90 tablet 2     lisinopril (PRINIVIL/ZESTRIL) 5 MG tablet Take 1 tablet (5 mg) by mouth daily 90 tablet 3     atorvastatin (LIPITOR) 40 MG tablet Take 1 tablet (40 mg) by mouth daily 90 tablet 2     VITAMIN B COMPLEX-C CAPS        Multiple Vitamin (MULTI-VITAMINS) TABS Take 1 tablet by mouth       ammonium lactate (AMLACTIN) 12 % cream Apply topically daily as needed        aspirin EC 81 MG EC tablet Take 1 tablet (81 mg) by mouth daily 30 tablet 0     VITAMIN E NATURAL PO Take 400 Units by mouth daily         ALLERGIES     Allergies   Allergen Reactions     Codeine Camsylate Other (See Comments)     \"I get loopy\"       PAST MEDICAL HISTORY:  Past Medical History:   Diagnosis Date     CAD (coronary artery disease) 5/2015    Left Heart cath - 90% stenosis mid LAD - PTCA/birfurcation stenting with MICKIE of mid LAD and 2nd diagonal branch placed and 100% occlusion 2nd diagonal branch - PTCA/MICKIE, 5/2015 EF 40-45% by Echo     Carcinoma in situ of skin      Depression      Hormone replacement therapy     she wants to continue HRT even though she is aware that it increases her lifetime risk of breast cancer.     Ischemic cardiomyopathy      Menopausal syndrome      NSTEMI (non-ST elevated myocardial infarction) (H) 5/21/2015     Osteoporosis 2007     Shortness of breath        PAST SURGICAL HISTORY:  Past Surgical History:   Procedure Laterality Date     BIOPSY OF SKIN LESION       " "HEART CATH LEFT HEART CATH  5/21/2015    MICKIE to second diagonal branch, bifurcation stent to Mid LAD. Severe 90% stenosis in mid LAD and 100% occlusion of second diagonal branch     ORTHOPEDIC SURGERY  2006    hip replacement       FAMILY HISTORY:  Family History   Problem Relation Age of Onset     CANCER Mother      Coronary Artery Disease Father 77     MI     Breast Cancer Daughter 53     daughter has stage IV breast cancer at age 53       SOCIAL HISTORY:  Social History     Social History     Marital status:      Spouse name: N/A     Number of children: 2     Years of education: N/A     Occupational History     Retired      Social History Main Topics     Smoking status: Never Smoker     Smokeless tobacco: Never Used     Alcohol use 0.0 oz/week     0 Standard drinks or equivalent per week      Comment: Rare     Drug use: No     Sexual activity: Not Currently     Partners: Male     Birth control/ protection: Post-menopausal     Other Topics Concern     Parent/Sibling W/ Cabg, Mi Or Angioplasty Before 65f 55m? No     Caffeine Concern No     no caffeine     Sleep Concern No     Stress Concern No     Special Diet No     Exercise Yes     walking dog, 2 story house, folk dancing     Seat Belt Yes     Social History Narrative       Review of Systems:  Skin:  Negative     Eyes:  Positive for glasses  ENT:  Negative    Respiratory:  Negative    Cardiovascular:  Negative    Gastroenterology: Negative    Genitourinary:  not assessed    Musculoskeletal:  Negative    Neurologic:  Negative    Psychiatric:  Negative    Heme/Lymph/Imm:  Negative    Endocrine:  Positive for hot flashes    Physical Exam:  Vitals: /62  Pulse 62  Ht 1.575 m (5' 2\")  Wt 55 kg (121 lb 3.2 oz)  BMI 22.17 kg/m2    Constitutional:  cooperative, alert and oriented, well developed, well nourished, in no acute distress        Skin:  warm and dry to the touch, no apparent skin lesions or masses noted        Head:  normocephalic, no masses or " lesions        Eyes:  pupils equal and round, conjunctivae and lids unremarkable, sclera white, no xanthalasma, EOMS intact, no nystagmus        ENT:  no pallor or cyanosis, dentition good        Neck:  carotid pulses are full and equal bilaterally, JVP normal, no carotid bruit, no thyromegaly        Chest:  normal breath sounds, clear to auscultation, normal A-P diameter, normal symmetry, normal respiratory excursion, no use of accessory muscles        Cardiac: regular rhythm, normal S1/S2, no S3 or S4, apical impulse not displaced, no murmurs, gallops or rubs                  Abdomen:  abdomen soft, non-tender, BS normoactive, no mass, no HSM, no bruits        Vascular: pulses full and equal, no bruits auscultated                                      Extremities and Back:  no deformities, clubbing, cyanosis, erythema observed   L radial site has firm vashti sized bump. Strong radial pulse, good ROM of wrist and fingers, good capillary refill. Proximal to radial insertion site her arm is diffusley echymotic, especially under/below her elbow    Neurological:  affect appropriate, oriented to time, person and place          Recent Lab Results:  LIPID RESULTS:  Lab Results   Component Value Date    CHOL 117 08/05/2015    HDL 63 08/05/2015    LDL 44 (L) 08/05/2015    TRIG 52 08/05/2015    CHOLHDLRATIO 1.9 08/05/2015       LIVER ENZYME RESULTS:  Lab Results   Component Value Date    AST 24 11/21/2016    ALT 31 11/21/2016       CBC RESULTS:  Lab Results   Component Value Date    WBC 6.6 11/23/2016    RBC 3.80 11/23/2016    HGB 12.2 11/23/2016    HCT 36.0 11/23/2016    MCV 95 11/23/2016    MCH 32.1 11/23/2016    MCHC 33.9 11/23/2016    RDW 12.3 11/23/2016     11/23/2016       BMP RESULTS:  Lab Results   Component Value Date     12/05/2016    POTASSIUM 4.0 12/05/2016    CHLORIDE 104 12/05/2016    CO2 32 (H) 12/05/2016    ANIONGAP 9 12/05/2016    GLC 98 12/05/2016    BUN 16 12/05/2016    CR 0.92 12/05/2016     GFRESTIMATED 59 (L) 12/05/2016    GFRESTBLACK 71 12/05/2016    COLTEN 9.5 12/05/2016        A1C RESULTS:  Lab Results   Component Value Date    A1C 5.7 05/21/2015       INR RESULTS:  Lab Results   Component Value Date    INR 1.00 05/21/2015    INR 1.67 (H) 01/06/2006           CC  Abilio Razo MD  [address] [place] [address]  [place] [id number]

## 2017-08-22 NOTE — PROGRESS NOTES
HISTORY OF PRESENT ILLNESS:  It is a pleasure for me to see Mrs. Bradshaw today.  She is here for followup of coronary artery disease.  This is a very delightful lady who has a couple of stents placed by us.  In , she had a non-Q-wave myocardial infarction, and there was mild left ventricular systolic dysfunction.  The culprit artery was the LAD, and this was revascularized with the insertion of a drug-eluting stent.  The second diagonal artery was revascularized as well.  Left ventricular systolic function became normal.  Towards the end of  she had recurrent discomfort, and peak troponin was 12.  Now the culprit lesion was the right PDA, and this was angioplastied and stented.  She continues on dual antiplatelet therapy at this time.  She unfortunately tripped and fell while gardening about 2 weeks ago.  She has orbital bruising as well as a hematoma on the back of her right forearm.  I am happy to say that this is nontender, and she tells me it is decreasing in size.  Otherwise, she is well from a cardiac point of view with no recurrent chest pains.  Cardiovascular system examination is normal.      IMPRESSION:   1.  Status post ACS x2.   2.  Well-controlled blood pressure.   3.  Dyslipidemia, on moderate-dose statin with excellent LDL numbers.   4.  Soft tissue injury from falling, undoubtedly made worse by dual antiplatelet therapy.      I will see her again in 4 months' time and then will discuss with her whether she would be a candidate for continuation of long-term dual antiplatelet therapy.         AVA MORALES MD, [facility]             D: 2017 14:03   T: 2017 14:41   MT: [email or web address]      Name:     MAKENNA BRADSHAW   MRN:      [id number]        Account:      [id number]   :      1935           Service Date: 2017      Document: [id number]

## 2017-08-22 NOTE — LETTER
8/22/2017    Soni Blackwell MD  [address]  [facility] [address]    RE: Charlotte Bradshaw       Dear Colleague,    I had the pleasure of seeing Charlotte Bradshaw in the [facility].    It is a pleasure for me to see Mrs. Bradshaw today.  She is here for followup of coronary artery disease.  This is a very delightful lady who has a couple of stents placed by us.  In 2015, she had a non-Q-wave myocardial infarction, and there was mild left ventricular systolic dysfunction.  The culprit artery was the LAD, and this was revascularized with the insertion of a drug-eluting stent.  The second diagonal artery was revascularized as well.  Left ventricular systolic function became normal.  Towards the end of 2016 she had recurrent discomfort, and peak troponin was 12.  Now the culprit lesion was the right PDA, and this was angioplastied and stented.  She continues on dual antiplatelet therapy at this time.  She unfortunately tripped and fell while gardening about 2 weeks ago.  She has orbital bruising as well as a hematoma on the back of her right forearm.  I am happy to say that this is nontender, and she tells me it is decreasing in size.  Otherwise, she is well from a cardiac point of view with no recurrent chest pains.  Cardiovascular system examination is normal.     Outpatient Encounter Prescriptions as of 8/22/2017   Medication Sig Dispense Refill     metoprolol (TOPROL-XL) 25 MG 24 hr tablet Take 0.5 tablets (12.5 mg) by mouth daily 45 tablet 2     clopidogrel (PLAVIX) 75 MG tablet Take 1 tablet (75 mg) by mouth daily 90 tablet 2     lisinopril (PRINIVIL/ZESTRIL) 5 MG tablet Take 1 tablet (5 mg) by mouth daily 90 tablet 3     atorvastatin (LIPITOR) 40 MG tablet Take 1 tablet (40 mg) by mouth daily 90 tablet 2     VITAMIN B COMPLEX-C CAPS        Multiple Vitamin (MULTI-VITAMINS) TABS Take 1 tablet by mouth       ammonium lactate (AMLACTIN) 12 % cream Apply topically daily as needed         aspirin EC 81 MG EC tablet Take 1 tablet (81 mg) by mouth daily 30 tablet 0     VITAMIN E NATURAL PO Take 400 Units by mouth daily       [DISCONTINUED] estrogens, conjugated, (PREMARIN) 0.3 MG tablet Take 1 tablet (0.3 mg) by mouth daily 90 tablet 3     [DISCONTINUED] multivitamin, therapeutic with minerals (THERA-VIT-M) TABS Take 1 tablet by mouth daily       [DISCONTINUED] vitamin  B complex with vitamin C (VITAMIN  B COMPLEX) TABS Take 1 tablet by mouth daily       No facility-administered encounter medications on file as of 8/22/2017.       IMPRESSION:   1.  Status post ACS x2.   2.  Well-controlled blood pressure.   3.  Dyslipidemia, on moderate-dose statin with excellent LDL numbers.   4.  Soft tissue injury from falling, undoubtedly made worse by dual antiplatelet therapy.      I will see her again in 4 months' time and then will discuss with her whether she would be a candidate for continuation of long-term dual antiplatelet therapy.     Again, thank you for allowing me to participate in the care of your patient.      Sincerely,    DR AVA MORALES MD     [facility]

## 2017-12-08 ENCOUNTER — OFFICE VISIT (OUTPATIENT)
Dept: CARDIOLOGY | Facility: CLINIC | Age: 82
End: 2017-12-08
Attending: INTERNAL MEDICINE
Payer: COMMERCIAL

## 2017-12-08 VITALS
BODY MASS INDEX: 22.26 KG/M2 | DIASTOLIC BLOOD PRESSURE: 60 MMHG | SYSTOLIC BLOOD PRESSURE: 122 MMHG | WEIGHT: 121 LBS | HEIGHT: 62 IN | HEART RATE: 68 BPM

## 2017-12-08 DIAGNOSIS — I24.9 ACS (ACUTE CORONARY SYNDROME) (H): ICD-10-CM

## 2017-12-08 DIAGNOSIS — I25.5 ISCHEMIC CARDIOMYOPATHY: ICD-10-CM

## 2017-12-08 PROCEDURE — 99213 OFFICE O/P EST LOW 20 MIN: CPT | Performed by: INTERNAL MEDICINE

## 2017-12-08 NOTE — PROGRESS NOTES
HISTORY OF PRESENT ILLNESS:  It is a pleasure for me to see Mrs. Bradshaw.  She is here for followup of coronary artery disease.  This is a lady with 2 episodes of acute coronary syndrome.  The first one was in .  There was mild left ventricular systolic dysfunction initially which has since recovered.  The culprit was the LAD.  A drug-eluting stent was placed.  A second diagonal was revascularized as well.  In 2016, she had recurrent chest discomfort.  Peak troponin was 12.  Now, she a lesion in the RPDA and this was angioplastied and stented.  When I saw her a few months ago, she had fallen and had facial bruising as well as a sizable hematoma on her forearm.  I am very happy to say that she has recovered very nicely and there is no significant residual scarring.  Nonetheless, she does complain of bruising very easily.  She does not like it.  She has no chest pain or shortness of breath.      The cardiovascular system examination is unremarkable.  Initially, blood pressure was 146 systolic.  This is definitely out of the ordinary.  She tells me she rushed to get to clinic on time.  She is also stressed as her stepson had  recently from a myocardial infarction.  I am happy to say that when I rechecked her blood pressure it was 122/60.      IMPRESSION:   1.  Stable coronary artery disease.   2.  Stable blood pressure.   3.  Dyslipidemia, on moderate dose statin and excellent LDL numbers.      It has been more than a year since she has been on dual antiplatelet therapy.  In view of her easy bruising as well as a history of trauma, I asked her to stop taking clopidogrel.  I did tell him that aspirin is a lifelong medication.  I will see her again in 6 months' time and if all is stable I will see her at annual intervals.         AVA MORALES MD, [facility]             D: 2017 14:25   T: 2017 15:43   MT: RISA      Name:     MAKENNA BRADSHAW   MRN:      [id number]        Account:      [id number]   :       1935           Service Date: 12/08/2017      Document: [id number]

## 2017-12-08 NOTE — LETTER
2017    Soni Blackwell MD  [employer] [address]  [facility] [address]    RE: Charlotte Bradshaw       Dear Colleague,    I had the pleasure of seeing Charlotte Bradshaw in the [facility].    HISTORY OF PRESENT ILLNESS:  It is a pleasure for me to see Mrs. Bradshaw.  She is here for followup of coronary artery disease.  This is a lady with 2 episodes of acute coronary syndrome.  The first one was in .  There was mild left ventricular systolic dysfunction initially which has since recovered.  The culprit was the LAD.  A drug-eluting stent was placed.  A second diagonal was revascularized as well.  In 2016, she had recurrent chest discomfort.  Peak troponin was 12.  Now, she a lesion in the RPDA and this was angioplastied and stented.  When I saw her a few months ago, she had fallen and had facial bruising as well as a sizable hematoma on her forearm.  I am very happy to say that she has recovered very nicely and there is no significant residual scarring.  Nonetheless, she does complain of bruising very easily.  She does not like it.  She has no chest pain or shortness of breath.      The cardiovascular system examination is unremarkable.  Initially, blood pressure was 146 systolic.  This is definitely out of the ordinary.  She tells me she rushed to get to clinic on time.  She is also stressed as her stepson had  recently from a myocardial infarction.  I am happy to say that when I rechecked her blood pressure it was 122/60.     Outpatient Encounter Prescriptions as of 2017   Medication Sig Dispense Refill     metoprolol (TOPROL-XL) 25 MG 24 hr tablet Take 0.5 tablets (12.5 mg) by mouth daily 45 tablet 2     clopidogrel (PLAVIX) 75 MG tablet Take 1 tablet (75 mg) by mouth daily 90 tablet 2     lisinopril (PRINIVIL/ZESTRIL) 5 MG tablet Take 1 tablet (5 mg) by mouth daily 90 tablet 3     atorvastatin (LIPITOR) 40 MG tablet Take 1 tablet (40 mg) by mouth daily 90 tablet 2      VITAMIN B COMPLEX-C CAPS        Multiple Vitamin (MULTI-VITAMINS) TABS Take 1 tablet by mouth       ammonium lactate (AMLACTIN) 12 % cream Apply topically daily as needed        aspirin EC 81 MG EC tablet Take 1 tablet (81 mg) by mouth daily 30 tablet 0     VITAMIN E NATURAL PO Take 400 Units by mouth daily       No facility-administered encounter medications on file as of 12/8/2017.       IMPRESSION:   1.  Stable coronary artery disease.   2.  Stable blood pressure.   3.  Dyslipidemia, on moderate dose statin and excellent LDL numbers.      It has been more than a year since she has been on dual antiplatelet therapy.  In view of her easy bruising as well as a history of trauma, I asked her to stop taking clopidogrel.  I did tell him that aspirin is a lifelong medication.  I will see her again in 6 months' time and if all is stable I will see her at annual intervals.     Again, thank you for allowing me to participate in the care of your patient.      Sincerely,    DR AVA MORALES MD     [facility]

## 2017-12-08 NOTE — PROGRESS NOTES
"HPI and Plan:   See dictation    Orders Placed This Encounter   Procedures     Follow-Up with Cardiologist       No orders of the defined types were placed in this encounter.      Encounter Diagnoses   Name Primary?     ACS (acute coronary syndrome) (H)      Ischemic cardiomyopathy        CURRENT MEDICATIONS:  Current Outpatient Prescriptions   Medication Sig Dispense Refill     metoprolol (TOPROL-XL) 25 MG 24 hr tablet Take 0.5 tablets (12.5 mg) by mouth daily 45 tablet 2     clopidogrel (PLAVIX) 75 MG tablet Take 1 tablet (75 mg) by mouth daily 90 tablet 2     lisinopril (PRINIVIL/ZESTRIL) 5 MG tablet Take 1 tablet (5 mg) by mouth daily 90 tablet 3     atorvastatin (LIPITOR) 40 MG tablet Take 1 tablet (40 mg) by mouth daily 90 tablet 2     VITAMIN B COMPLEX-C CAPS        Multiple Vitamin (MULTI-VITAMINS) TABS Take 1 tablet by mouth       ammonium lactate (AMLACTIN) 12 % cream Apply topically daily as needed        aspirin EC 81 MG EC tablet Take 1 tablet (81 mg) by mouth daily 30 tablet 0     VITAMIN E NATURAL PO Take 400 Units by mouth daily         ALLERGIES     Allergies   Allergen Reactions     Codeine Camsylate Other (See Comments)     \"I get loopy\"       PAST MEDICAL HISTORY:  Past Medical History:   Diagnosis Date     CAD (coronary artery disease) 5/2015    Left Heart cath - 90% stenosis mid LAD - PTCA/birfurcation stenting with MICKIE of mid LAD and 2nd diagonal branch placed and 100% occlusion 2nd diagonal branch - PTCA/MICKIE, 5/2015 EF 40-45% by Echo     Carcinoma in situ of skin      Depression      Hormone replacement therapy     she wants to continue HRT even though she is aware that it increases her lifetime risk of breast cancer.     Ischemic cardiomyopathy      Menopausal syndrome      NSTEMI (non-ST elevated myocardial infarction) (H) 5/21/2015     Osteoporosis 2007     Shortness of breath        PAST SURGICAL HISTORY:  Past Surgical History:   Procedure Laterality Date     BIOPSY OF SKIN LESION       " "HEART CATH LEFT HEART CATH  5/21/2015    MICKIE to second diagonal branch, bifurcation stent to Mid LAD. Severe 90% stenosis in mid LAD and 100% occlusion of second diagonal branch     ORTHOPEDIC SURGERY  2006    hip replacement       FAMILY HISTORY:  Family History   Problem Relation Age of Onset     CANCER Mother      Coronary Artery Disease Father 77     MI     Breast Cancer Daughter 53     daughter has stage IV breast cancer at age 53       SOCIAL HISTORY:  Social History     Social History     Marital status:      Spouse name: N/A     Number of children: 2     Years of education: N/A     Occupational History     Retired      Social History Main Topics     Smoking status: Never Smoker     Smokeless tobacco: Never Used     Alcohol use 0.0 oz/week     0 Standard drinks or equivalent per week      Comment: Rare     Drug use: No     Sexual activity: Not Currently     Partners: Male     Birth control/ protection: Post-menopausal     Other Topics Concern     Parent/Sibling W/ Cabg, Mi Or Angioplasty Before 65f 55m? No     Caffeine Concern No     no caffeine     Sleep Concern No     Stress Concern No     Special Diet No     Exercise Yes     walking dog, 2 story house, folk dancing     Seat Belt Yes     Social History Narrative       Review of Systems:  Skin:  Negative     Eyes:  Positive for glasses  ENT:  Negative    Respiratory:  Negative    Cardiovascular:  Negative    Gastroenterology: Negative    Genitourinary:  Negative    Musculoskeletal:  Negative    Neurologic:  Negative    Psychiatric:  Negative    Heme/Lymph/Imm:  Negative    Endocrine:  Positive for hot flashes    Physical Exam:  Vitals: /60  Pulse 68  Ht 1.575 m (5' 2\")  Wt 54.9 kg (121 lb)  BMI 22.13 kg/m2    Constitutional:  cooperative, alert and oriented, well developed, well nourished, in no acute distress        Skin:  warm and dry to the touch, no apparent skin lesions or masses noted          Head:  normocephalic, no masses or lesions "        Eyes:  pupils equal and round, conjunctivae and lids unremarkable, sclera white, no xanthalasma, EOMS intact, no nystagmus        Lymph:      ENT:  no pallor or cyanosis, dentition good        Neck:  carotid pulses are full and equal bilaterally, JVP normal, no carotid bruit        Respiratory:  normal breath sounds, clear to auscultation, normal A-P diameter, normal symmetry, normal respiratory excursion, no use of accessory muscles         Cardiac: regular rhythm, normal S1/S2, no S3 or S4, apical impulse not displaced, no murmurs, gallops or rubs                pulses full and equal, no bruits auscultated                                        GI:  abdomen soft, non-tender, BS normoactive, no mass, no HSM, no bruits        Extremities and Muscular Skeletal:  no deformities, clubbing, cyanosis, erythema observed         L radial site has firm vashti sized bump. Strong radial pulse, good ROM of wrist and fingers, good capillary refill. Proximal to radial insertion site her arm is diffusley echymotic, especially under/below her elbow    Neurological:  no gross motor deficits        Psych:  Alert and Oriented x 3        Recent Lab Results:  LIPID RESULTS:  Lab Results   Component Value Date    CHOL 117 08/05/2015    HDL 63 08/05/2015    LDL 44 (L) 08/05/2015    TRIG 52 08/05/2015    CHOLHDLRATIO 1.9 08/05/2015       LIVER ENZYME RESULTS:  Lab Results   Component Value Date    AST 24 11/21/2016    ALT 31 11/21/2016       CBC RESULTS:  Lab Results   Component Value Date    WBC 6.6 11/23/2016    RBC 3.80 11/23/2016    HGB 12.2 11/23/2016    HCT 36.0 11/23/2016    MCV 95 11/23/2016    MCH 32.1 11/23/2016    MCHC 33.9 11/23/2016    RDW 12.3 11/23/2016     11/23/2016       BMP RESULTS:  Lab Results   Component Value Date     12/05/2016    POTASSIUM 4.0 12/05/2016    CHLORIDE 104 12/05/2016    CO2 32 (H) 12/05/2016    ANIONGAP 9 12/05/2016    GLC 98 12/05/2016    BUN 16 12/05/2016    CR 0.92 12/05/2016     GFRESTIMATED 59 (L) 12/05/2016    GFRESTBLACK 71 12/05/2016    COLTEN 9.5 12/05/2016        A1C RESULTS:  Lab Results   Component Value Date    A1C 5.7 05/21/2015       INR RESULTS:  Lab Results   Component Value Date    INR 1.00 05/21/2015    INR 1.67 (H) 01/06/2006           CC  Abilio Razo MD  [address]  [place] [address]

## 2017-12-08 NOTE — MR AVS SNAPSHOT
"              After Visit Summary   12/8/2017    Charlotte Bradshaw    MRN: [id number]           Patient Information     Date Of Birth          1935        Visit Information        Provider Department      12/8/2017 1:45 PM Danni, Abilio Douglas MD [facility]        Today's Diagnoses     ACS (acute coronary syndrome) (H)        Ischemic cardiomyopathy           Follow-ups after your visit        Your next 10 appointments already scheduled     Mar 12, 2018 11:45 AM CDT   MA SCREENING DIGITAL BILATERAL with [email or web address]   [facility] ([facility])    [address] [place] [address], [address]  [facility] [id number] [phone]           Do not use any powder, lotion or deodorant under your arms or on your breast. If you do, we will ask you to remove it before your exam.  Wear comfortable, two-piece clothing.  If you have any allergies, tell your care team.  Bring any previous mammograms from other facilities or have them mailed to the breast center. Three-dimensional (3D) mammograms are available at [facility] locations in [facility], [facility], [facility], and [place]. [facility] locations include [place] and [facility] in [place]. Benefits of 3D mammograms include: - Improved rate of cancer detection - Decreases your chance of having to go back for more tests, which means fewer: - \"False-positive\" results (This means that there is an abnormal area but it isn't cancer.) - Invasive testing procedures, such as a biopsy or surgery - Can provide clearer images of the breast if you have dense breast tissue. 3D mammography is an optional exam that anyone can have with a 2D mammogram. It doesn't replace or take the place of a 2D mammogram. 2D mammograms remain an effective screening test for all women.  Not all insurance companies cover the cost of a 3D mammogram. Check with your insurance.            Mar " "2018 12:00 PM CDT   PHYSICAL with Kirstie Rivera MD   [facility] ([facility])    [address] [place] [id number] [phone]              Who to contact     If you have questions or need follow up information about today's clinic visit or your schedule please contact [facility] directly at [phone].  Normal or non-critical lab and imaging results will be communicated to you by Adjughart, letter or phone within 4 business days after the clinic has received the results. If you do not hear from us within 7 days, please contact the clinic through Adjughart or phone. If you have a critical or abnormal lab result, we will notify you by phone as soon as possible.  Submit refill requests through uTaP or call your pharmacy and they will forward the refill request to us. Please allow 3 business days for your refill to be completed.          Additional Information About Your Visit        uTaP Information     uTaP lets you send messages to your doctor, view your test results, renew your prescriptions, schedule appointments and more. To sign up, go to [email or web address] . Click on \"Log in\" on the left side of the screen, which will take you to the Welcome page. Then click on \"Sign up Now\" on the right side of the page.     You will be asked to enter the access code listed below, as well as some personal information. Please follow the directions to create your username and password.     Your access code is: [email or web address]  Expires: 3/8/2018  2:10 PM     Your access code will  in 90 days. If you need help or a new code, please call your [place] clinic or [phone].        Care EveryWhere ID     This is your Care EveryWhere ID. This could be used by other organizations to access your [place] medical records  [id number]        Your Vitals Were     Pulse Height BMI (Body Mass Index)             " "68 1.575 m (5' 2\") 22.13 kg/m2          Blood Pressure from Last 3 Encounters:   12/08/17 122/60   08/22/17 132/62   03/08/17 132/60    Weight from Last 3 Encounters:   12/08/17 54.9 kg (121 lb)   08/22/17 55 kg (121 lb 3.2 oz)   03/08/17 55.3 kg (122 lb)              We Performed the Following     Follow-Up with Cardiologist        Primary Care Provider Office Phone # Fax #    Soni Blackwell [phone]       [address]  [address]  [facility] [address]        Equal Access to Services     [facility] : Hadii hadley salazaro Soericka, waaxda lunehemiasadaha, qaybta kaalmada harmanyadonald, galilea pleitez . So [facility] [phone].    ATENCIÓN: Si habla español, tiene a concepcion disposición servicios gratuitos de asistencia lingüística. Llame al [phone].    We comply with applicable federal civil rights laws and [place] laws. We do not discriminate on the basis of race, color, national origin, age, disability, sex, sexual orientation, or gender identity.            Thank you!     Thank you for choosing [facility]  for your care. Our goal is always to provide you with excellent care. Hearing back from our patients is one way we can continue to improve our services. Please take a few minutes to complete the written survey that you may receive in the mail after your visit with us. Thank you!             Your Updated Medication List - Protect others around you: Learn how to safely use, store and throw away your medicines at www.disposemymeds.org.          This list is accurate as of: 12/8/17  2:10 PM.  Always use your most recent med list.                   Brand Name Dispense Instructions for use Diagnosis    ammonium lactate 12 % cream    AMLACTIN     Apply topically daily as needed        aspirin 81 MG EC tablet     30 tablet    Take 1 tablet (81 mg) by mouth daily    ACS (acute coronary syndrome) (H)       atorvastatin 40 MG tablet    LIPITOR    " 90 tablet    Take 1 tablet (40 mg) by mouth daily    ACS (acute coronary syndrome) (H)       clopidogrel 75 MG tablet    PLAVIX    90 tablet    Take 1 tablet (75 mg) by mouth daily    NSTEMI (non-ST elevated myocardial infarction) (H)       lisinopril 5 MG tablet    PRINIVIL/ZESTRIL    90 tablet    Take 1 tablet (5 mg) by mouth daily    NSTEMI (non-ST elevated myocardial infarction) (H)       metoprolol 25 MG 24 hr tablet    TOPROL-XL    45 tablet    Take 0.5 tablets (12.5 mg) by mouth daily    NSTEMI (non-ST elevated myocardial infarction) (H)       MULTI-VITAMINS Tabs      Take 1 tablet by mouth        VITAMIN B COMPLEX-C Caps           VITAMIN E NATURAL PO      Take 400 Units by mouth daily

## 2018-02-07 DIAGNOSIS — I24.9 ACS (ACUTE CORONARY SYNDROME) (H): ICD-10-CM

## 2018-02-07 RX ORDER — ATORVASTATIN CALCIUM 40 MG/1
40 TABLET, FILM COATED ORAL DAILY
Qty: 90 TABLET | Refills: 1 | Status: SHIPPED | OUTPATIENT
Start: 2018-02-07 | End: 2018-08-24

## 2018-03-12 ENCOUNTER — OFFICE VISIT (OUTPATIENT)
Dept: OBGYN | Facility: CLINIC | Age: 83
End: 2018-03-12
Payer: COMMERCIAL

## 2018-03-12 ENCOUNTER — RADIANT APPOINTMENT (OUTPATIENT)
Dept: MAMMOGRAPHY | Facility: CLINIC | Age: 83
End: 2018-03-12
Payer: COMMERCIAL

## 2018-03-12 VITALS
DIASTOLIC BLOOD PRESSURE: 58 MMHG | SYSTOLIC BLOOD PRESSURE: 100 MMHG | BODY MASS INDEX: 22.45 KG/M2 | HEIGHT: 62 IN | WEIGHT: 122 LBS

## 2018-03-12 DIAGNOSIS — Z01.419 ENCOUNTER FOR GYNECOLOGICAL EXAMINATION WITHOUT ABNORMAL FINDING: Primary | ICD-10-CM

## 2018-03-12 DIAGNOSIS — Z12.31 VISIT FOR SCREENING MAMMOGRAM: ICD-10-CM

## 2018-03-12 PROCEDURE — 99397 PER PM REEVAL EST PAT 65+ YR: CPT | Performed by: OBSTETRICS & GYNECOLOGY

## 2018-03-12 PROCEDURE — 77067 SCR MAMMO BI INCL CAD: CPT | Mod: TC

## 2018-03-12 ASSESSMENT — ANXIETY QUESTIONNAIRES
3. WORRYING TOO MUCH ABOUT DIFFERENT THINGS: SEVERAL DAYS
5. BEING SO RESTLESS THAT IT IS HARD TO SIT STILL: NOT AT ALL
2. NOT BEING ABLE TO STOP OR CONTROL WORRYING: SEVERAL DAYS
GAD7 TOTAL SCORE: 3
7. FEELING AFRAID AS IF SOMETHING AWFUL MIGHT HAPPEN: SEVERAL DAYS
1. FEELING NERVOUS, ANXIOUS, OR ON EDGE: NOT AT ALL
6. BECOMING EASILY ANNOYED OR IRRITABLE: NOT AT ALL
IF YOU CHECKED OFF ANY PROBLEMS ON THIS QUESTIONNAIRE, HOW DIFFICULT HAVE THESE PROBLEMS MADE IT FOR YOU TO DO YOUR WORK, TAKE CARE OF THINGS AT HOME, OR GET ALONG WITH OTHER PEOPLE: NOT DIFFICULT AT ALL

## 2018-03-12 ASSESSMENT — PATIENT HEALTH QUESTIONNAIRE - PHQ9: 5. POOR APPETITE OR OVEREATING: NOT AT ALL

## 2018-03-12 NOTE — MR AVS SNAPSHOT
"              After Visit Summary   3/12/2018    Charlotte Bradshaw    MRN: [id number]           Patient Information     Date Of Birth          1935        Visit Information        Provider Department      3/12/2018 12:00 PM Kirstie Rivera MD [facility]        Today's Diagnoses     Encounter for gynecological examination without abnormal finding    -  1       Follow-ups after your visit        Who to contact     If you have questions or need follow up information about today's clinic visit or your schedule please contact [facility] directly at [phone].  Normal or non-critical lab and imaging results will be communicated to you by W[employer]art, letter or phone within 4 business days after the clinic has received the results. If you do not hear from us within 7 days, please contact the clinic through [employer] or phone. If you have a critical or abnormal lab result, we will notify you by phone as soon as possible.  Submit refill requests through [employer] or call your pharmacy and they will forward the refill request to us. Please allow 3 business days for your refill to be completed.          Additional Information About Your Visit        MyChart Information     [employer] lets you send messages to your doctor, view your test results, renew your prescriptions, schedule appointments and more. To sign up, go to [email or web address] . Click on \"Log in\" on the left side of the screen, which will take you to the Welcome page. Then click on \"Sign up Now\" on the right side of the page.     You will be asked to enter the access code listed below, as well as some personal information. Please follow the directions to create your username and password.     Your access code is: [email or web address]  Expires: 6/10/2018 12:59 PM     Your access code will  in 90 days. If you need help or a new code, please call your [facility] clinic or [phone].        Care EveryWhere ID     This is your " "Care EveryWhere ID. This could be used by other organizations to access your [facility] medical records  [id number]        Your Vitals Were     Height BMI (Body Mass Index)                5' 1.5\" (1.562 m) 22.68 kg/m2           Blood Pressure from Last 3 Encounters:   03/12/18 100/58   12/08/17 122/60   08/22/17 132/62    Weight from Last 3 Encounters:   03/12/18 122 lb (55.3 kg)   12/08/17 121 lb (54.9 kg)   08/22/17 121 lb 3.2 oz (55 kg)              Today, you had the following     No orders found for display       Primary Care Provider Office Phone # Fax #    Soni Blackwell [phone]       [facility] [address]  [facility] [address]        Equal Access to Services     DELILAH GARCIA : Hadii aad ku hadasho Soericka, waaxda luqadaha, qaybta kaalmada adekoryyadonald, galilea pleitez . So [facility] [phone].    ATENCIÓN: Si habla español, tiene a concepcion disposición servicios gratuitos de asistencia lingüística. Alonzo al [phone].    We comply with applicable federal civil rights laws and [place] laws. We do not discriminate on the basis of race, color, national origin, age, disability, sex, sexual orientation, or gender identity.            Thank you!     Thank you for choosing [facility]  for your care. Our goal is always to provide you with excellent care. Hearing back from our patients is one way we can continue to improve our services. Please take a few minutes to complete the written survey that you may receive in the mail after your visit with us. Thank you!             Your Updated Medication List - Protect others around you: Learn how to safely use, store and throw away your medicines at www.disposemymeds.org.          This list is accurate as of 3/12/18  1:02 PM.  Always use your most recent med list.                   Brand Name Dispense Instructions for use Diagnosis    ammonium lactate 12 % cream    AMLACTIN     Apply topically daily as " needed        aspirin 81 MG EC tablet     30 tablet    Take 1 tablet (81 mg) by mouth daily    ACS (acute coronary syndrome) (H)       atorvastatin 40 MG tablet    LIPITOR    90 tablet    Take 1 tablet (40 mg) by mouth daily    ACS (acute coronary syndrome) (H)       clopidogrel 75 MG tablet    PLAVIX    90 tablet    Take 1 tablet (75 mg) by mouth daily    NSTEMI (non-ST elevated myocardial infarction) (H)       lisinopril 5 MG tablet    PRINIVIL/ZESTRIL    90 tablet    Take 1 tablet (5 mg) by mouth daily    NSTEMI (non-ST elevated myocardial infarction) (H)       metoprolol succinate 25 MG 24 hr tablet    TOPROL-XL    45 tablet    Take 0.5 tablets (12.5 mg) by mouth daily    NSTEMI (non-ST elevated myocardial infarction) (H)       MULTI-VITAMINS Tabs      Take 1 tablet by mouth        VITAMIN B COMPLEX-C Caps           VITAMIN E NATURAL PO      Take 400 Units by mouth daily

## 2018-03-12 NOTE — PROGRESS NOTES
Charlotte is a 82 year old  female who presents for annual exam.     Besides routine health maintenance, she has no other health concerns today .    Do you have a Health Care Directive?: Yes: Advance Directive has been received and scanned.    Fall risk:   Fallen 2 or more times in the past year?: No  Any fall with injury in the past year?: No    HPI:  The patient's PCP is Soni Blackwell MD.  Good health this years  Had MI year before   No gyn concerns    GYNECOLOGIC HISTORY:  No LMP recorded. Patient is postmenopausal..   reports that she has never smoked. She has never used smokeless tobacco.    Patient is not sexually active.  STD testing offered?  Declined  Last PHQ-9 score on record=   PHQ-9 SCORE 3/12/2018   Total Score 1     Last GAD7 score on record=   SCOOBY-7 SCORE 3/7/2016 3/8/2017 3/12/2018   Total Score 6 1 3     Alcohol Score = 1    HEALTH MAINTENANCE:  Cholesterol: 8/5/15   Total= 117, Triglycerides=52, HDL=63, LDL=44  Cholesterol   Date Value Ref Range Status   2015 117 0 - 200 mg/dL Final     Comment:     LDL Cholesterol is the primary guide to therapy.   The NCEP recommends further evaluation of: patients with cholesterol greater   than 200 mg/dL if additional risk factors are present, cholesterol greater   than   240 mg/dL, triglycerides greater than 150 mg/dL, or HDL less than 40 mg/dL.     2015 177 <200 mg/dL Final     Comment:     LDL Cholesterol is the primary guide to therapy.   The NCEP recommends further evaluation of: patients with cholesterol greater   than 200 mg/dL if additional risk factors are present, cholesterol greater   than   240 mg/dL, triglycerides greater than 150 mg/dL, or HDL less than 40 mg/dL.      Last Mammo: 3/8/17, Result: normal, Next Mammo: today   Pap: None further, over 65  DEXA: per patient over 5 years ago, has on scheduled 2018  Colonoscopy:  3/3/15, Result:  normal, Next Colonoscopy: None further    Health maintenance updated:   yes    HISTORY:  Obstetric History       T2      L2     SAB0   TAB0   Ectopic0   Multiple0   Live Births2       # Outcome Date GA Lbr Zeeshan/2nd Weight Sex Delivery Anes PTL Lv   2 Term         SHANKAR   1 Term         SHANKAR        Patient Active Problem List   Diagnosis     AK (actinic keratosis)     History of skin cancer     Xerosis of skin     Skin exam, screening for cancer     CAD (coronary artery disease)     NSTEMI (non-ST elevated myocardial infarction) (H)     Depression     Ischemic cardiomyopathy     ACS (acute coronary syndrome) (H)     Past Surgical History:   Procedure Laterality Date     BIOPSY OF SKIN LESION       HEART CATH LEFT HEART CATH  2015    MICKIE to second diagonal branch, bifurcation stent to Mid LAD. Severe 90% stenosis in mid LAD and 100% occlusion of second diagonal branch     ORTHOPEDIC SURGERY  2006    hip replacement      Social History   Substance Use Topics     Smoking status: Never Smoker     Smokeless tobacco: Never Used     Alcohol use 0.0 oz/week     0 Standard drinks or equivalent per week      Comment: Rare      Problem (# of Occurrences) Relation (Name,Age of Onset)    Breast Cancer (1) Daughter (53): daughter has stage IV breast cancer at age 53    CANCER (1) Mother    Coronary Artery Disease (1) Father (77): MI            Current Outpatient Prescriptions   Medication Sig     atorvastatin (LIPITOR) 40 MG tablet Take 1 tablet (40 mg) by mouth daily     metoprolol (TOPROL-XL) 25 MG 24 hr tablet Take 0.5 tablets (12.5 mg) by mouth daily     clopidogrel (PLAVIX) 75 MG tablet Take 1 tablet (75 mg) by mouth daily     lisinopril (PRINIVIL/ZESTRIL) 5 MG tablet Take 1 tablet (5 mg) by mouth daily     VITAMIN B COMPLEX-C CAPS      Multiple Vitamin (MULTI-VITAMINS) TABS Take 1 tablet by mouth     ammonium lactate (AMLACTIN) 12 % cream Apply topically daily as needed      aspirin EC 81 MG EC tablet Take 1 tablet (81 mg) by mouth daily     VITAMIN E NATURAL PO Take 400 Units by  "mouth daily     No current facility-administered medications for this visit.        Allergies   Allergen Reactions     Codeine Camsylate Other (See Comments)     \"I get loopy\"       Past medical, surgical, social and family history were reviewed and updated in EPIC.    ROS:   12 point review of systems negative other than symptoms noted below.  Skin: Skin Dryness  Musculoskeletal: Height Loss  Endocrine: Loss of Hair    EXAM:  /58  Ht 5' 1.5\" (1.562 m)  Wt 122 lb (55.3 kg)  BMI 22.68 kg/m2   BMI: Body mass index is 22.68 kg/(m^2).    EXAM:  Constitutional: Appearance: Well nourished, well developed alert, in no acute distress  Neck:  Lymph Nodes:  No lymphadenopathy present    Thyroid:  Gland size normal, nontender, no nodules or masses present  on palpation  Chest:  Respiratory Effort:  Breathing unlabored  Cardiovascular:Heart    Auscultation:  Regular rate, normal rhythm, no murmurs present  Breasts: Inspection of Breasts:  No lymphadenopathy present., Palpation of Breasts and Axillae:  No masses present on palpation, no breast tenderness., Axillary Lymph Nodes:  No lymphadenopathy present. and No nodularity, asymmetry or nipple discharge bilaterally.  Gastrointestinal:  Abdominal Examination:  Abdomen nontender to palpation, tone normal without     rigidity or guarding, no masses present, umbilicus without lesions    Liver and speen:  No hepatomegaly present, liver nontender to palpation    Hernias:  No hernias present  Lymphatic: Lymph Nodes:  No other lymphadenopathy present  Skin:  General Inspection:  No rashes present, no lesions present, no areas of  discoloration.    Genitalia and Groin:  No rashes present, no lesions present, no areas of  discoloration, no masses present  Neurologic/Psychiatric:    Mental Status:  Oriented X3     Pelvic Exam:  External Genitalia:     Normal appearance for age, no discharge present, no tenderness present, no inflammatory lesions present, color normal  Vagina:  "    Normal vaginal vault without central or paravaginal defects, no discharge present, no inflammatory lesions present, no masses present  Bladder:     Nontender to palpation  Urethra:   Urethral Body:  Urethra palpation normal, urethra structural support normal   Urethral Meatus:  No erythema or lesions present  Vaginal stenosis- can not do internal pelvic exam anymore  Perineum:     Perineum within normal limits, no evidence of trauma, no rashes or skin lesions present  Anus:     Anus within normal limits, no hemorrhoids present  Inguinal Lymph Nodes:     No lymphadenopathy present  Pubic Hair:     Normal pubic hair distribution for age  Genitalia and Groin:     No rashes present, no lesions present, no areas of discoloration, no masses present      COUNSELING:   Reviewed preventive health counseling, as reflected in patient instructions       Regular exercise       Healthy diet/nutrition    BMI:  Body mass index is 22.68 kg/(m^2).     reports that she has never smoked. She has never used smokeless tobacco.      ASSESSMENT:  82 year old female with satisfactory annual exam.    ICD-10-CM    1. Encounter for gynecological examination without abnormal finding Z01.419        PLAN:  Considering move to senior living      Kirstie Rivera MD

## 2018-03-13 ENCOUNTER — TELEPHONE (OUTPATIENT)
Dept: OBGYN | Facility: CLINIC | Age: 83
End: 2018-03-13

## 2018-03-13 ASSESSMENT — PATIENT HEALTH QUESTIONNAIRE - PHQ9: SUM OF ALL RESPONSES TO PHQ QUESTIONS 1-9: 1

## 2018-03-13 ASSESSMENT — ANXIETY QUESTIONNAIRES: GAD7 TOTAL SCORE: 3

## 2018-03-13 NOTE — TELEPHONE ENCOUNTER
Reason for Call:  Other call back    Detailed comments: Patient had her Annual with  yesterday. She is supposed to update her Medication list. It is all the same except she is no longer taking the Clopidogrel. Please delete this from her list.    Phone Number Patient can be reached at: Cell number on file:    Telephone Information:   Mobile [phone]       Best Time: today    Can we leave a detailed message on this number? YES    Call taken on 3/13/2018 at 2:40 PM by Sowmya Carr

## 2018-05-09 DIAGNOSIS — I21.4 NSTEMI (NON-ST ELEVATED MYOCARDIAL INFARCTION) (H): ICD-10-CM

## 2018-05-09 RX ORDER — LISINOPRIL 5 MG/1
5 TABLET ORAL DAILY
Qty: 90 TABLET | Refills: 1 | Status: SHIPPED | OUTPATIENT
Start: 2018-05-09 | End: 2018-11-28

## 2018-05-09 RX ORDER — METOPROLOL SUCCINATE 25 MG/1
12.5 TABLET, EXTENDED RELEASE ORAL DAILY
Qty: 45 TABLET | Refills: 1 | Status: SHIPPED | OUTPATIENT
Start: 2018-05-09 | End: 2018-11-28

## 2018-08-24 DIAGNOSIS — I24.9 ACS (ACUTE CORONARY SYNDROME) (H): ICD-10-CM

## 2018-08-24 RX ORDER — ATORVASTATIN CALCIUM 40 MG/1
40 TABLET, FILM COATED ORAL DAILY
Qty: 90 TABLET | Refills: 0 | Status: SHIPPED | OUTPATIENT
Start: 2018-08-24 | End: 2018-11-28

## 2018-11-28 DIAGNOSIS — I24.9 ACS (ACUTE CORONARY SYNDROME) (H): ICD-10-CM

## 2018-11-28 DIAGNOSIS — I21.4 NSTEMI (NON-ST ELEVATED MYOCARDIAL INFARCTION) (H): ICD-10-CM

## 2018-11-28 RX ORDER — LISINOPRIL 5 MG/1
5 TABLET ORAL DAILY
Qty: 90 TABLET | Refills: 0 | Status: SHIPPED | OUTPATIENT
Start: 2018-11-28 | End: 2019-03-12

## 2018-11-28 RX ORDER — ATORVASTATIN CALCIUM 40 MG/1
40 TABLET, FILM COATED ORAL DAILY
Qty: 90 TABLET | Refills: 0 | Status: SHIPPED | OUTPATIENT
Start: 2018-11-28 | End: 2019-03-08

## 2018-11-28 RX ORDER — METOPROLOL SUCCINATE 25 MG/1
12.5 TABLET, EXTENDED RELEASE ORAL DAILY
Qty: 45 TABLET | Refills: 0 | Status: SHIPPED | OUTPATIENT
Start: 2018-11-28 | End: 2019-03-08

## 2019-01-24 ENCOUNTER — OFFICE VISIT (OUTPATIENT)
Dept: CARDIOLOGY | Facility: CLINIC | Age: 84
End: 2019-01-24
Payer: COMMERCIAL

## 2019-01-24 VITALS
WEIGHT: 121.9 LBS | SYSTOLIC BLOOD PRESSURE: 134 MMHG | BODY MASS INDEX: 22.43 KG/M2 | HEIGHT: 62 IN | HEART RATE: 52 BPM | DIASTOLIC BLOOD PRESSURE: 68 MMHG

## 2019-01-24 DIAGNOSIS — I25.5 ISCHEMIC CARDIOMYOPATHY: Primary | ICD-10-CM

## 2019-01-24 DIAGNOSIS — I25.10 CORONARY ARTERY DISEASE INVOLVING NATIVE CORONARY ARTERY OF NATIVE HEART WITHOUT ANGINA PECTORIS: ICD-10-CM

## 2019-01-24 PROCEDURE — 99214 OFFICE O/P EST MOD 30 MIN: CPT | Performed by: INTERNAL MEDICINE

## 2019-01-24 ASSESSMENT — MIFFLIN-ST. JEOR: SCORE: 953.24

## 2019-01-24 NOTE — PROGRESS NOTES
"HPI and Plan:   See dictation    Orders Placed This Encounter   Procedures     Follow-Up with Cardiologist       Orders Placed This Encounter   Medications     Ascorbic Acid (VITAMIN C PO)     Sig: Take by mouth as needed     B COMPLEX VITAMINS PO     Sig: Take by mouth daily       Encounter Diagnoses   Name Primary?     Ischemic cardiomyopathy Yes     Coronary artery disease involving native coronary artery of native heart without angina pectoris        CURRENT MEDICATIONS:  Current Outpatient Medications   Medication Sig Dispense Refill     ammonium lactate (AMLACTIN) 12 % cream Apply topically daily as needed        Ascorbic Acid (VITAMIN C PO) Take by mouth as needed       aspirin EC 81 MG EC tablet Take 1 tablet (81 mg) by mouth daily 30 tablet 0     atorvastatin (LIPITOR) 40 MG tablet Take 1 tablet (40 mg) by mouth daily 90 tablet 0     B COMPLEX VITAMINS PO Take by mouth daily       lisinopril (PRINIVIL/ZESTRIL) 5 MG tablet Take 1 tablet (5 mg) by mouth daily 90 tablet 0     metoprolol succinate (TOPROL-XL) 25 MG 24 hr tablet Take 0.5 tablets (12.5 mg) by mouth daily 45 tablet 0     Multiple Vitamin (MULTI-VITAMINS) TABS Take 1 tablet by mouth       VITAMIN E NATURAL PO Take 400 Units by mouth daily         ALLERGIES     Allergies   Allergen Reactions     Codeine Camsylate Other (See Comments)     \"I get loopy\"       PAST MEDICAL HISTORY:  Past Medical History:   Diagnosis Date     CAD (coronary artery disease) 5/2015    Left Heart cath - 90% stenosis mid LAD - PTCA/birfurcation stenting with MICKIE of mid LAD and 2nd diagonal branch placed and 100% occlusion 2nd diagonal branch - PTCA/MICKIE, 5/2015 EF 40-45% by Echo     Carcinoma in situ of skin      Depression      Hormone replacement therapy     she wants to continue HRT even though she is aware that it increases her lifetime risk of breast cancer.     Ischemic cardiomyopathy      Menopausal syndrome      NSTEMI (non-ST elevated myocardial infarction) (H) " 5/21/2015     Osteoporosis 2007     Shortness of breath        PAST SURGICAL HISTORY:  Past Surgical History:   Procedure Laterality Date     BIOPSY OF SKIN LESION       HEART CATH LEFT HEART CATH  5/21/2015    MICKIE to second diagonal branch, bifurcation stent to Mid LAD. Severe 90% stenosis in mid LAD and 100% occlusion of second diagonal branch     ORTHOPEDIC SURGERY  2006    hip replacement       FAMILY HISTORY:  Family History   Problem Relation Age of Onset     Cancer Mother      Coronary Artery Disease Father 77        MI     Breast Cancer Daughter 53        daughter has stage IV breast cancer at age 53       SOCIAL HISTORY:  Social History     Socioeconomic History     Marital status:      Spouse name: None     Number of children: 2     Years of education: None     Highest education level: None   Social Needs     Financial resource strain: None     Food insecurity - worry: None     Food insecurity - inability: None     Transportation needs - medical: None     Transportation needs - non-medical: None   Occupational History     Occupation: Retired   Tobacco Use     Smoking status: Never Smoker     Smokeless tobacco: Never Used   Substance and Sexual Activity     Alcohol use: Yes     Alcohol/week: 0.0 oz     Comment: Rare     Drug use: No     Sexual activity: Not Currently     Partners: Male     Birth control/protection: Post-menopausal   Other Topics Concern     Parent/sibling w/ CABG, MI or angioplasty before 65F 55M? No      Service Not Asked     Blood Transfusions Not Asked     Caffeine Concern No     Comment: no caffeine     Occupational Exposure Not Asked     Hobby Hazards Not Asked     Sleep Concern No     Stress Concern No     Weight Concern Not Asked     Special Diet No     Back Care Not Asked     Exercise Yes     Comment: walking dog, 2 story house, folk dancing     Bike Helmet Not Asked     Seat Belt Yes     Self-Exams Not Asked   Social History Narrative     None       Review of  "Systems:  Skin:  Negative     Eyes:  Positive for glasses  ENT:  Negative    Respiratory:  Negative    Cardiovascular:    Positive for;edema  Gastroenterology: Negative    Genitourinary:  Negative    Musculoskeletal:  Negative    Neurologic:  Negative    Psychiatric:  Negative    Heme/Lymph/Imm:  Negative    Endocrine:  Positive for night sweats;hot flashes    Physical Exam:  Vitals: /68   Pulse 52   Ht 1.562 m (5' 1.5\")   Wt 55.3 kg (121 lb 14.4 oz)   BMI 22.66 kg/m      Constitutional:           Skin:             Head:           Eyes:           Lymph:      ENT:           Neck:           Respiratory:            Cardiac:                                                           GI:           Extremities and Muscular Skeletal:                 Neurological:           Psych:           Recent Lab Results:  LIPID RESULTS:  Lab Results   Component Value Date    CHOL 117 08/05/2015    HDL 63 08/05/2015    LDL 44 (L) 08/05/2015    TRIG 52 08/05/2015    CHOLHDLRATIO 1.9 08/05/2015       LIVER ENZYME RESULTS:  Lab Results   Component Value Date    AST 24 11/21/2016    ALT 31 11/21/2016       CBC RESULTS:  Lab Results   Component Value Date    WBC 6.6 11/23/2016    RBC 3.80 11/23/2016    HGB 12.2 11/23/2016    HCT 36.0 11/23/2016    MCV 95 11/23/2016    MCH 32.1 11/23/2016    MCHC 33.9 11/23/2016    RDW 12.3 11/23/2016     11/23/2016       BMP RESULTS:  Lab Results   Component Value Date     12/05/2016    POTASSIUM 4.0 12/05/2016    CHLORIDE 104 12/05/2016    CO2 32 (H) 12/05/2016    ANIONGAP 9 12/05/2016    GLC 98 12/05/2016    BUN 16 12/05/2016    CR 0.92 12/05/2016    GFRESTIMATED 59 (L) 12/05/2016    GFRESTBLACK 71 12/05/2016    COLTEN 9.5 12/05/2016        A1C RESULTS:  Lab Results   Component Value Date    A1C 5.7 05/21/2015       INR RESULTS:  Lab Results   Component Value Date    INR 1.00 05/21/2015    INR 1.67 (H) 01/06/2006           CC  Soni Blackwell MD  [facility]  [address]  [place] [address]

## 2019-01-24 NOTE — LETTER
"1/24/2019    Soni Blackwell MD, MD  [facility]  [facility] [address]    RE: Charlotte YANELY Bradshaw       Dear Colleague,    I had the pleasure of seeing Charlotte YANELY Bradshaw in the [facility].    HPI and Plan:   See dictation    Orders Placed This Encounter   Procedures     Follow-Up with Cardiologist       Orders Placed This Encounter   Medications     Ascorbic Acid (VITAMIN C PO)     Sig: Take by mouth as needed     B COMPLEX VITAMINS PO     Sig: Take by mouth daily       Encounter Diagnoses   Name Primary?     Ischemic cardiomyopathy Yes     Coronary artery disease involving native coronary artery of native heart without angina pectoris        CURRENT MEDICATIONS:  Current Outpatient Medications   Medication Sig Dispense Refill     ammonium lactate (AMLACTIN) 12 % cream Apply topically daily as needed        Ascorbic Acid (VITAMIN C PO) Take by mouth as needed       aspirin EC 81 MG EC tablet Take 1 tablet (81 mg) by mouth daily 30 tablet 0     atorvastatin (LIPITOR) 40 MG tablet Take 1 tablet (40 mg) by mouth daily 90 tablet 0     B COMPLEX VITAMINS PO Take by mouth daily       lisinopril (PRINIVIL/ZESTRIL) 5 MG tablet Take 1 tablet (5 mg) by mouth daily 90 tablet 0     metoprolol succinate (TOPROL-XL) 25 MG 24 hr tablet Take 0.5 tablets (12.5 mg) by mouth daily 45 tablet 0     Multiple Vitamin (MULTI-VITAMINS) TABS Take 1 tablet by mouth       VITAMIN E NATURAL PO Take 400 Units by mouth daily         ALLERGIES     Allergies   Allergen Reactions     Codeine Camsylate Other (See Comments)     \"I get loopy\"       PAST MEDICAL HISTORY:  Past Medical History:   Diagnosis Date     CAD (coronary artery disease) 5/2015    Left Heart cath - 90% stenosis mid LAD - PTCA/birfurcation stenting with MICKIE of mid LAD and 2nd diagonal branch placed and 100% occlusion 2nd diagonal branch - PTCA/MICKIE, 5/2015 EF 40-45% by Echo     Carcinoma in situ of skin      Depression      Hormone replacement " therapy     she wants to continue HRT even though she is aware that it increases her lifetime risk of breast cancer.     Ischemic cardiomyopathy      Menopausal syndrome      NSTEMI (non-ST elevated myocardial infarction) (H) 5/21/2015     Osteoporosis 2007     Shortness of breath        PAST SURGICAL HISTORY:  Past Surgical History:   Procedure Laterality Date     BIOPSY OF SKIN LESION       HEART CATH LEFT HEART CATH  5/21/2015    MICKIE to second diagonal branch, bifurcation stent to Mid LAD. Severe 90% stenosis in mid LAD and 100% occlusion of second diagonal branch     ORTHOPEDIC SURGERY  2006    hip replacement       FAMILY HISTORY:  Family History   Problem Relation Age of Onset     Cancer Mother      Coronary Artery Disease Father 77        MI     Breast Cancer Daughter 53        daughter has stage IV breast cancer at age 53       SOCIAL HISTORY:  Social History     Socioeconomic History     Marital status:      Spouse name: None     Number of children: 2     Years of education: None     Highest education level: None   Social Needs     Financial resource strain: None     Food insecurity - worry: None     Food insecurity - inability: None     Transportation needs - medical: None     Transportation needs - non-medical: None   Occupational History     Occupation: Retired   Tobacco Use     Smoking status: Never Smoker     Smokeless tobacco: Never Used   Substance and Sexual Activity     Alcohol use: Yes     Alcohol/week: 0.0 oz     Comment: Rare     Drug use: No     Sexual activity: Not Currently     Partners: Male     Birth control/protection: Post-menopausal   Other Topics Concern     Parent/sibling w/ CABG, MI or angioplasty before 65F 55M? No      Service Not Asked     Blood Transfusions Not Asked     Caffeine Concern No     Comment: no caffeine     Occupational Exposure Not Asked     Hobby Hazards Not Asked     Sleep Concern No     Stress Concern No     Weight Concern Not Asked     Special Diet  "No     Back Care Not Asked     Exercise Yes     Comment: walking dog, 2 story house, folk dancing     Bike Helmet Not Asked     Seat Belt Yes     Self-Exams Not Asked   Social History Narrative     None       Review of Systems:  Skin:  Negative     Eyes:  Positive for glasses  ENT:  Negative    Respiratory:  Negative    Cardiovascular:    Positive for;edema  Gastroenterology: Negative    Genitourinary:  Negative    Musculoskeletal:  Negative    Neurologic:  Negative    Psychiatric:  Negative    Heme/Lymph/Imm:  Negative    Endocrine:  Positive for night sweats;hot flashes    Physical Exam:  Vitals: /68   Pulse 52   Ht 1.562 m (5' 1.5\")   Wt 55.3 kg (121 lb 14.4 oz)   BMI 22.66 kg/m       Constitutional:           Skin:             Head:           Eyes:           Lymph:      ENT:           Neck:           Respiratory:            Cardiac:                                                           GI:           Extremities and Muscular Skeletal:                 Neurological:           Psych:           Recent Lab Results:  LIPID RESULTS:  Lab Results   Component Value Date    CHOL 117 08/05/2015    HDL 63 08/05/2015    LDL 44 (L) 08/05/2015    TRIG 52 08/05/2015    CHOLHDLRATIO 1.9 08/05/2015       LIVER ENZYME RESULTS:  Lab Results   Component Value Date    AST 24 11/21/2016    ALT 31 11/21/2016       CBC RESULTS:  Lab Results   Component Value Date    WBC 6.6 11/23/2016    RBC 3.80 11/23/2016    HGB 12.2 11/23/2016    HCT 36.0 11/23/2016    MCV 95 11/23/2016    MCH 32.1 11/23/2016    MCHC 33.9 11/23/2016    RDW 12.3 11/23/2016     11/23/2016       BMP RESULTS:  Lab Results   Component Value Date     12/05/2016    POTASSIUM 4.0 12/05/2016    CHLORIDE 104 12/05/2016    CO2 32 (H) 12/05/2016    ANIONGAP 9 12/05/2016    GLC 98 12/05/2016    BUN 16 12/05/2016    CR 0.92 12/05/2016    GFRESTIMATED 59 (L) 12/05/2016    GFRESTBLACK 71 12/05/2016    COLTEN 9.5 12/05/2016        A1C RESULTS:  Lab Results "   Component Value Date    A1C 5.7 05/21/2015       INR RESULTS:  Lab Results   Component Value Date    INR 1.00 05/21/2015    INR 1.67 (H) 01/06/2006           CC  Soni Blackwell MD  [facility] [address] [place] [address]                  Thank you for allowing me to participate in the care of your patient.      Sincerely,     DR AVA MORALES MD     [facility]    cc:   Soni Blackwell MD  [facility] [address] [place], [place] [id number]

## 2019-01-24 NOTE — PROGRESS NOTES
Service Date: 2019      HISTORY OF PRESENT ILLNESS:  It is a pleasure to follow up with Mrs. Bradshaw who has coronary artery disease and a history of mild left ventricular systolic dysfunction from ischemic cardiomyopathy.  The latter condition has resolved.  This is a lady with 2 episodes of acute coronary syndrome.  First one was in .  There was mild left ventricular systolic dysfunction initially.  Coupled with the LAD.  This was revascularized with drug-eluting stent.  The second diagonal was revascularized as well.  In 2016 she had recurrent chest discomfort.  Peak troponin was 12.  Now she has a new lesion in the right PDA and this was angioplastied and stented.  On both occasions she had completed a full year of dual antiplatelet therapy.  She is well.  Denies chest pain, shortness of breath, PND, orthopnea or ankle swelling.  Cardiac examination is unremarkable.      IMPRESSION:   1.  Stable coronary artery disease.   2.  Ischemic cardiomyopathy, now resolved.   3.  Stable blood pressure.   4.  Dyslipidemia, on moderate dose statin.  Lipid profile has not been checked in a while.  I have asked her to get this done when she next sees her primary physician.  Last LDL was 44 in  which was obviously excellent.      I will see her again in a year's time for further followup.         AVA MORALES MD, [facility]             D: 2019   T: 2019   MT: RISA      Name:     MAKENNA BRADSHAW   MRN:      [id number]        Account:      [id number]   :      1935           Service Date: 2019      Document: [id number]

## 2019-01-24 NOTE — LETTER
2019      Soni Blackwell MD  [facility]  [facility] [address]      RE: Charlotte Bradshaw       Dear Colleague,    I had the pleasure of seeing Charlotte Bradshaw in the [facility].    Service Date: 2019      HISTORY OF PRESENT ILLNESS:  It is a pleasure to follow up with Mrs. Bradshaw who has coronary artery disease and a history of mild left ventricular systolic dysfunction from ischemic cardiomyopathy.  The latter condition has resolved.  This is a lady with 2 episodes of acute coronary syndrome.  First one was in . There was mild left ventricular systolic dysfunction initially.  Coupled with the LAD.  This was revascularized with drug-eluting stent.  The second diagonal was revascularized as well.  In 2016 she had recurrent chest discomfort.  Peak troponin was 12.  Now she has a new lesion in the right PDA and this was angioplastied and stented.  On both occasions she had completed a full year of dual antiplatelet therapy.  She is well.  Denies chest pain, shortness of breath, PND, orthopnea or ankle swelling.  Cardiac examination is unremarkable.      IMPRESSION:   1.  Stable coronary artery disease.   2.  Ischemic cardiomyopathy, now resolved.   3.  Stable blood pressure.   4.  Dyslipidemia, on moderate dose statin.  Lipid profile has not been checked in a while.  I have asked her to get this done when she next sees her primary physician.  Last LDL was 44 in  which was obviously excellent.      I will see her again in a year's time for further followup.         AVA MORALES MD, [facility]             D: 2019   T: 2019   MT: RISA      Name:     CHARLOTTE BRADSHAW   MRN:      [id number]        Account:      [id number]   :      1935           Service Date: 2019      Document: [id number]         Outpatient Encounter Medications as of 2019   Medication Sig Dispense Refill     ammonium lactate (AMLACTIN) 12 % cream Apply topically daily as  needed        Ascorbic Acid (VITAMIN C PO) Take by mouth as needed       aspirin EC 81 MG EC tablet Take 1 tablet (81 mg) by mouth daily 30 tablet 0     atorvastatin (LIPITOR) 40 MG tablet Take 1 tablet (40 mg) by mouth daily 90 tablet 0     B COMPLEX VITAMINS PO Take by mouth daily       lisinopril (PRINIVIL/ZESTRIL) 5 MG tablet Take 1 tablet (5 mg) by mouth daily 90 tablet 0     metoprolol succinate (TOPROL-XL) 25 MG 24 hr tablet Take 0.5 tablets (12.5 mg) by mouth daily 45 tablet 0     Multiple Vitamin (MULTI-VITAMINS) TABS Take 1 tablet by mouth       VITAMIN E NATURAL PO Take 400 Units by mouth daily       [DISCONTINUED] VITAMIN B COMPLEX-C CAPS        No facility-administered encounter medications on file as of 1/24/2019.        Again, thank you for allowing me to participate in the care of your patient.      Sincerely,    DR AVA MORLAES MD     [facility]

## 2019-03-08 DIAGNOSIS — I21.4 NSTEMI (NON-ST ELEVATED MYOCARDIAL INFARCTION) (H): ICD-10-CM

## 2019-03-08 DIAGNOSIS — I24.9 ACS (ACUTE CORONARY SYNDROME) (H): ICD-10-CM

## 2019-03-08 RX ORDER — METOPROLOL SUCCINATE 25 MG/1
12.5 TABLET, EXTENDED RELEASE ORAL DAILY
Qty: 45 TABLET | Refills: 3 | Status: SHIPPED | OUTPATIENT
Start: 2019-03-08 | End: 2020-03-25

## 2019-03-08 RX ORDER — ATORVASTATIN CALCIUM 40 MG/1
40 TABLET, FILM COATED ORAL DAILY
Qty: 90 TABLET | Refills: 3 | Status: SHIPPED | OUTPATIENT
Start: 2019-03-08 | End: 2020-04-21

## 2019-03-12 DIAGNOSIS — I21.4 NSTEMI (NON-ST ELEVATED MYOCARDIAL INFARCTION) (H): ICD-10-CM

## 2019-03-12 RX ORDER — LISINOPRIL 5 MG/1
5 TABLET ORAL DAILY
Qty: 90 TABLET | Refills: 3 | Status: SHIPPED | OUTPATIENT
Start: 2019-03-12 | End: 2020-03-25

## 2019-03-12 NOTE — PROGRESS NOTES
"  Charlotte is a 83 year old  female who presents for annual exam.     Besides routine health maintenance, {NO OTHER:388253}.    Do you have a Health Care Directive?: {HEALTHCARE DIRECTIVE STATUS:784671}    Fall risk:   {Fall Risk for Medicare Annual Wellness Visit:416722::\"Fall Risk Assessment completed per order.\"}    HPI:    ***    The patient's PCP is Dr Soni Blackwell MD.    GYNECOLOGIC HISTORY:  No LMP recorded. Patient is postmenopausal..   reports that  has never smoked. she has never used smokeless tobacco.  Patient {IS/IS NOT:9024} sexually active.  STD testing offered?  {GC/CHLAMYDIA:076850}     Last PHQ-9 score on record=   PHQ-9 SCORE 3/12/2018   PHQ-9 Total Score 1     Last GAD7 score on record=   SCOOBY-7 SCORE 3/7/2016 3/8/2017 3/12/2018   Total Score 6 1 3     Alcohol Score = ***    HEALTH MAINTENANCE:  Cholesterol: followed by cardiology  Cholesterol   Date Value Ref Range Status   2015 117 0 - 200 mg/dL Final     Comment:     LDL Cholesterol is the primary guide to therapy.   The NCEP recommends further evaluation of: patients with cholesterol greater   than 200 mg/dL if additional risk factors are present, cholesterol greater   than   240 mg/dL, triglycerides greater than 150 mg/dL, or HDL less than 40 mg/dL.     2015 177 <200 mg/dL Final     Comment:     LDL Cholesterol is the primary guide to therapy.   The NCEP recommends further evaluation of: patients with cholesterol greater   than 200 mg/dL if additional risk factors are present, cholesterol greater   than   240 mg/dL, triglycerides greater than 150 mg/dL, or HDL less than 40 mg/dL.        Last Mammo: 3/12/18, Result: normal, Next Mammo: today   Pap: no further pap recommended over age 65  DEXA: over 6 years ago, was scheduled last year  Colonoscopy:  3/3/15, Result:  normal, Next Colonoscopy: no further screening recommended  Health maintenance updated:  yes    HISTORY:  Obstetric History       T2      L2     " SAB0   TAB0   Ectopic0   Multiple0   Live Births2       # Outcome Date GA Lbr Zeeshan/2nd Weight Sex Delivery Anes PTL Lv   2 Term         SHANKAR   1 Term         SHANKAR        Patient Active Problem List   Diagnosis     AK (actinic keratosis)     History of skin cancer     Xerosis of skin     Skin exam, screening for cancer     CAD (coronary artery disease)     NSTEMI (non-ST elevated myocardial infarction) (H)     Depression     Ischemic cardiomyopathy     ACS (acute coronary syndrome) (H)     Past Surgical History:   Procedure Laterality Date     BIOPSY OF SKIN LESION       HEART CATH LEFT HEART CATH  5/21/2015    MICKIE to second diagonal branch, bifurcation stent to Mid LAD. Severe 90% stenosis in mid LAD and 100% occlusion of second diagonal branch     ORTHOPEDIC SURGERY  2006    hip replacement      Social History     Tobacco Use     Smoking status: Never Smoker     Smokeless tobacco: Never Used   Substance Use Topics     Alcohol use: Yes     Alcohol/week: 0.0 oz     Comment: Rare      Problem (# of Occurrences) Relation (Name,Age of Onset)    Breast Cancer (1) Daughter (53): daughter has stage IV breast cancer at age 53    Cancer (1) Mother    Coronary Artery Disease (1) Father (77): MI            Current Outpatient Medications   Medication Sig     ammonium lactate (AMLACTIN) 12 % cream Apply topically daily as needed      Ascorbic Acid (VITAMIN C PO) Take by mouth as needed     aspirin EC 81 MG EC tablet Take 1 tablet (81 mg) by mouth daily     atorvastatin (LIPITOR) 40 MG tablet Take 1 tablet (40 mg) by mouth daily     B COMPLEX VITAMINS PO Take by mouth daily     lisinopril (PRINIVIL/ZESTRIL) 5 MG tablet Take 1 tablet (5 mg) by mouth daily     metoprolol succinate ER (TOPROL-XL) 25 MG 24 hr tablet Take 0.5 tablets (12.5 mg) by mouth daily     Multiple Vitamin (MULTI-VITAMINS) TABS Take 1 tablet by mouth     VITAMIN E NATURAL PO Take 400 Units by mouth daily     No current facility-administered medications for this  "visit.        Allergies   Allergen Reactions     Codeine Camsylate Other (See Comments)     \"I get loopy\"       Past medical, surgical, social and family history were reviewed and updated in EPIC.    ROS:   {[facility] ROSGYN:902704::\"12 point review of systems negative other than symptoms noted below.\"}    EXAM:  There were no vitals taken for this visit.   BMI: There is no height or weight on file to calculate BMI.    EXAM:  Constitutional: Appearance: Well nourished, well developed alert, in no acute distress  Neck:  Lymph Nodes:  No lymphadenopathy present    Thyroid:  Gland size normal, nontender, no nodules or masses present  on palpation  Chest:  Respiratory Effort:  Breathing unlabored  Cardiovascular:Heart    Auscultation:  Regular rate, normal rhythm, no murmurs present  Breasts: {[facility] Breast exam:898737}  Gastrointestinal:  Abdominal Examination:  Abdomen nontender to palpation, tone normal without     rigidity or guarding, no masses present, umbilicus without lesions    Liver and speen:  No hepatomegaly present, liver nontender to palpation    Hernias:  No hernias present  Lymphatic: Lymph Nodes:  No other lymphadenopathy present  Skin:  General Inspection:  No rashes present, no lesions present, no areas of  discoloration.    Genitalia and Groin:  No rashes present, no lesions present, no areas of  discoloration, no masses present  Neurologic/Psychiatric:    Mental Status:  Oriented X3     {Pelvic Exam:293395}    COUNSELING:   {Female:863086::\"Reviewed preventive health counseling, as reflected in patient instructions\"}    BMI:  There is no height or weight on file to calculate BMI.  {Weight Management Plan -- Delete if patient has a normal BMI:830420}   reports that  has never smoked. she has never used smokeless tobacco.  {Tobacco Cessation -- Delete if patient is a non-smoker:972678}    ASSESSMENT:  83 year old female with satisfactory annual exam.  No diagnosis found.    PLAN:  ***    Kirstie Rivera, " MD

## 2019-03-13 ENCOUNTER — OFFICE VISIT (OUTPATIENT)
Dept: OBGYN | Facility: CLINIC | Age: 84
End: 2019-03-13
Payer: COMMERCIAL

## 2019-03-13 ENCOUNTER — ANCILLARY PROCEDURE (OUTPATIENT)
Dept: MAMMOGRAPHY | Facility: CLINIC | Age: 84
End: 2019-03-13
Payer: COMMERCIAL

## 2019-03-13 VITALS
BODY MASS INDEX: 22.63 KG/M2 | DIASTOLIC BLOOD PRESSURE: 64 MMHG | SYSTOLIC BLOOD PRESSURE: 124 MMHG | WEIGHT: 123 LBS | HEIGHT: 62 IN

## 2019-03-13 DIAGNOSIS — Z01.419 ENCOUNTER FOR GYNECOLOGICAL EXAMINATION WITHOUT ABNORMAL FINDING: Primary | ICD-10-CM

## 2019-03-13 DIAGNOSIS — Z12.31 VISIT FOR SCREENING MAMMOGRAM: ICD-10-CM

## 2019-03-13 PROCEDURE — 77067 SCR MAMMO BI INCL CAD: CPT | Mod: TC

## 2019-03-13 PROCEDURE — G0101 CA SCREEN;PELVIC/BREAST EXAM: HCPCS | Performed by: OBSTETRICS & GYNECOLOGY

## 2019-03-13 ASSESSMENT — MIFFLIN-ST. JEOR: SCORE: 958.23

## 2019-03-13 NOTE — PROGRESS NOTES
Charlotte is a 83 year old  female who presents for Medicare Limited exam.     Do you have a Health Care Directive?: Yes: Patient states has Advance Directive and will bring in a copy to clinic.    Fall risk:   Fall Risk Assessment completed per order.    HPI :  Patient has cardiologist  History of cardiac disease  No longer on hormone replacement therapy  Vagina is obliterated so we can't do internal pelvic exams on her    She hasn't been going in to get her labs with her primary, wanted us to do primary for her which we won't do.     She has no gyn concerns or meds.         GYNECOLOGIC HISTORY:  No LMP recorded. Patient is postmenopausal..   reports that  has never smoked. she has never used smokeless tobacco.  STD testing offered?  not sexually active    Last PHQ-9 score on record=   PHQ-9 SCORE 3/12/2018   PHQ-9 Total Score 1     Last GAD7 score on record=   SCOOBY-7 SCORE 3/7/2016 3/8/2017 3/12/2018   Total Score 6 1 3       HEALTH MAINTENANCE:  Cholesterol: followed by cardiology  Cholesterol   Date Value Ref Range Status   2015 117 0 - 200 mg/dL Final     Comment:     LDL Cholesterol is the primary guide to therapy.   The NCEP recommends further evaluation of: patients with cholesterol greater   than 200 mg/dL if additional risk factors are present, cholesterol greater   than   240 mg/dL, triglycerides greater than 150 mg/dL, or HDL less than 40 mg/dL.     2015 177 <200 mg/dL Final     Comment:     LDL Cholesterol is the primary guide to therapy.   The NCEP recommends further evaluation of: patients with cholesterol greater   than 200 mg/dL if additional risk factors are present, cholesterol greater   than   240 mg/dL, triglycerides greater than 150 mg/dL, or HDL less than 40 mg/dL.        Last Mammo: 3/12/18, Result: normal, Next Mammo: today   Pap: no further pap recommended over age 65  DEXA: over 6 years ago, was scheduled last year  Colonoscopy:  3/3/15, Result:  normal, Next Colonoscopy:  no further screening recommended  Health maintenance updated:  yes    HISTORY:  Obstetric History       T2      L2     SAB0   TAB0   Ectopic0   Multiple0   Live Births2       # Outcome Date GA Lbr Zeeshan/2nd Weight Sex Delivery Anes PTL Lv   2 Term         SHANKAR   1 Term         SHANKAR        Past Medical History:   Diagnosis Date     CAD (coronary artery disease) 2015    Left Heart cath - 90% stenosis mid LAD - PTCA/birfurcation stenting with MICKIE of mid LAD and 2nd diagonal branch placed and 100% occlusion 2nd diagonal branch - PTCA/MICKIE, 2015 EF 40-45% by Echo     Carcinoma in situ of skin      Depression      Hormone replacement therapy     she wants to continue HRT even though she is aware that it increases her lifetime risk of breast cancer.     Ischemic cardiomyopathy      Menopausal syndrome      NSTEMI (non-ST elevated myocardial infarction) (H) 2015     Osteoporosis 2007     Shortness of breath      Past Surgical History:   Procedure Laterality Date     BIOPSY OF SKIN LESION       HEART CATH LEFT HEART CATH  2015    MICKIE to second diagonal branch, bifurcation stent to Mid LAD. Severe 90% stenosis in mid LAD and 100% occlusion of second diagonal branch     ORTHOPEDIC SURGERY  2006    hip replacement     Family History   Problem Relation Age of Onset     Cancer Mother      Coronary Artery Disease Father 77        MI     Breast Cancer Daughter 53        daughter has stage IV breast cancer at age 53     Social History     Socioeconomic History     Marital status:      Spouse name: Not on file     Number of children: 2     Years of education: Not on file     Highest education level: Not on file   Occupational History     Occupation: Retired   Social Needs     Financial resource strain: Not on file     Food insecurity:     Worry: Not on file     Inability: Not on file     Transportation needs:     Medical: Not on file     Non-medical: Not on file   Tobacco Use     Smoking status: Never  Smoker     Smokeless tobacco: Never Used   Substance and Sexual Activity     Alcohol use: Yes     Alcohol/week: 0.0 oz     Comment: Rare     Drug use: No     Sexual activity: Not Currently     Partners: Male     Birth control/protection: Post-menopausal   Lifestyle     Physical activity:     Days per week: Not on file     Minutes per session: Not on file     Stress: Not on file   Relationships     Social connections:     Talks on phone: Not on file     Gets together: Not on file     Attends Anabaptism service: Not on file     Active member of club or organization: Not on file     Attends meetings of clubs or organizations: Not on file     Relationship status: Not on file     Intimate partner violence:     Fear of current or ex partner: Not on file     Emotionally abused: Not on file     Physically abused: Not on file     Forced sexual activity: Not on file   Other Topics Concern     Parent/sibling w/ CABG, MI or angioplasty before 65F 55M? No      Service Not Asked     Blood Transfusions Not Asked     Caffeine Concern No     Comment: no caffeine     Occupational Exposure Not Asked     Hobby Hazards Not Asked     Sleep Concern No     Stress Concern No     Weight Concern Not Asked     Special Diet No     Back Care Not Asked     Exercise Yes     Comment: walking dog, 2 story house, folk dancing     Bike Helmet Not Asked     Seat Belt Yes     Self-Exams Not Asked   Social History Narrative     Not on file     Current Outpatient Medications   Medication Sig     ammonium lactate (AMLACTIN) 12 % cream Apply topically daily as needed      Ascorbic Acid (VITAMIN C PO) Take by mouth as needed     aspirin EC 81 MG EC tablet Take 1 tablet (81 mg) by mouth daily     atorvastatin (LIPITOR) 40 MG tablet Take 1 tablet (40 mg) by mouth daily     B COMPLEX VITAMINS PO Take by mouth daily     lisinopril (PRINIVIL/ZESTRIL) 5 MG tablet Take 1 tablet (5 mg) by mouth daily     metoprolol succinate ER (TOPROL-XL) 25 MG 24 hr tablet  "Take 0.5 tablets (12.5 mg) by mouth daily     Multiple Vitamin (MULTI-VITAMINS) TABS Take 1 tablet by mouth     VITAMIN E NATURAL PO Take 400 Units by mouth daily     No current facility-administered medications for this visit.      Allergies   Allergen Reactions     Codeine Camsylate Other (See Comments)     \"I get loopy\"       Past medical, surgical, social and family history were reviewed and updated in EPIC.    EXAM:  /64   Ht 1.562 m (5' 1.5\")   Wt 55.8 kg (123 lb)   BMI 22.86 kg/m     BMI: Body mass index is 22.86 kg/m .    Constitutional: Appearance: Well nourished, well developed alert, in no acute distress  Breasts: Inspection of Breasts:  No lymphadenopathy present    Palpation of Breasts and Axillae:  No masses present on palpation, no  breast tenderness    Axillary Lymph Nodes:  No lymphadenopathy present  Neurologic/Psychiatric:    Mental Status:  Oriented X3     Pelvic Exam:  External Genitalia:     Normal appearance for age, no discharge present, no tenderness present, no inflammatory lesions present, color normal  Vagina:     Normal vaginal vault without central or paravaginal defects, ATROPHIC  Bladder:     Nontender to palpation  Urethra:   Urethral Body:  Urethra palpation normal, urethra structural support normal   Urethral Meatus:  No erythema or lesions present    Perineum:     Perineum within normal limits, no evidence of trauma, no rashes or skin lesions present  Inguinal Lymph Nodes:     No lymphadenopathy present      Body mass index is 22.86 kg/m .  Weight management plan noted, stable and monitoring   reports that  has never smoked. she has never used smokeless tobacco.      ASSESSMENT:  83 year old female with satisfactory annual exam.    ICD-10-CM    1. Encounter for gynecological examination without abnormal finding Z01.419        COUNSELING:   Reviewed preventive health counseling, as reflected in patient instructions       Advanced Planning     PLAN/PATIENT " INSTRUCTIONS:  Normal breast exam and vulvar exam today  Can't really do full pelvic exams on her anymore due to vaginal stenosis    I instructed patient to schedule annual physicals with her primary care doctor and they should do her labs and manage her regular care. We don't do primary care for elderly patients.     She only needs to see us every 2 yrs for LTD gyn exam.  We will space her mammograms to every 2 yrs.       Kirstie Rivera MD

## 2020-01-29 ENCOUNTER — OFFICE VISIT (OUTPATIENT)
Dept: CARDIOLOGY | Facility: CLINIC | Age: 85
End: 2020-01-29
Payer: COMMERCIAL

## 2020-01-29 VITALS
WEIGHT: 115.4 LBS | HEART RATE: 60 BPM | DIASTOLIC BLOOD PRESSURE: 64 MMHG | SYSTOLIC BLOOD PRESSURE: 136 MMHG | BODY MASS INDEX: 21.23 KG/M2 | HEIGHT: 62 IN

## 2020-01-29 DIAGNOSIS — I25.10 CORONARY ARTERY DISEASE INVOLVING NATIVE CORONARY ARTERY OF NATIVE HEART WITHOUT ANGINA PECTORIS: ICD-10-CM

## 2020-01-29 DIAGNOSIS — I25.5 ISCHEMIC CARDIOMYOPATHY: ICD-10-CM

## 2020-01-29 DIAGNOSIS — I24.9 ACS (ACUTE CORONARY SYNDROME) (H): Primary | ICD-10-CM

## 2020-01-29 PROCEDURE — 99214 OFFICE O/P EST MOD 30 MIN: CPT | Performed by: INTERNAL MEDICINE

## 2020-01-29 ASSESSMENT — MIFFLIN-ST. JEOR: SCORE: 918.76

## 2020-01-29 NOTE — PROGRESS NOTES
HPI and Plan:   It is a pleasure to follow up with Mrs. Bradshaw who has coronary artery disease and a history of mild left ventricular systolic dysfunction from ischemic cardiomyopathy.  The latter condition has resolved.  This is a lady with 2 episodes of acute coronary syndrome.  First one was in 2015.  There was mild left ventricular systolic dysfunction initially.    Coronary angiography revealed 90% stenosis in the mid LAD with 100% stenosis of the second diagonal.  These lesions were treated with bifurcation stenting into both lesions with an excellent angiographic result. In 11/2016 she had recurrent chest discomfort.  Peak troponin was 12.  Now she has a new lesion in the right PDA and this was angioplastied and stented.  On both occasions she had completed a full year of dual antiplatelet therapy.      She has been well in the year since I last saw her.  She denies chest pain, shortness of breath, PND, orthopnea or ankle swelling.  Cardiac examination is unremarkable.      IMPRESSION:   1.  Stable coronary artery disease.   2.  Ischemic cardiomyopathy, now resolved.   3.  Stable blood pressure.   4.  Dyslipidemia, on moderate dose atorvastatin.  Lipid profile 2019 showed LDL 47 HDL 65.  Excellent numbers.   I will see her again in a year's time for further followup.     Orders Placed This Encounter   Procedures     Follow-Up with Cardiologist       No orders of the defined types were placed in this encounter.      Encounter Diagnoses   Name Primary?     ACS (acute coronary syndrome) (H) Yes     Ischemic cardiomyopathy      Coronary artery disease involving native coronary artery of native heart without angina pectoris        CURRENT MEDICATIONS:  Current Outpatient Medications   Medication Sig Dispense Refill     ammonium lactate (AMLACTIN) 12 % cream Apply topically daily as needed        Ascorbic Acid (VITAMIN C PO) Take by mouth as needed       aspirin EC 81 MG EC tablet Take 1 tablet (81 mg) by mouth  "daily 30 tablet 0     atorvastatin (LIPITOR) 40 MG tablet Take 1 tablet (40 mg) by mouth daily 90 tablet 3     B COMPLEX VITAMINS PO Take by mouth daily       lisinopril (PRINIVIL/ZESTRIL) 5 MG tablet Take 1 tablet (5 mg) by mouth daily 90 tablet 3     metoprolol succinate ER (TOPROL-XL) 25 MG 24 hr tablet Take 0.5 tablets (12.5 mg) by mouth daily 45 tablet 3     Multiple Vitamin (MULTI-VITAMINS) TABS Take 1 tablet by mouth       VITAMIN E NATURAL PO Take 400 Units by mouth daily         ALLERGIES     Allergies   Allergen Reactions     Codeine Camsylate Other (See Comments)     \"I get loopy\"       PAST MEDICAL HISTORY:  Past Medical History:   Diagnosis Date     CAD (coronary artery disease) 5/2015    Left Heart cath - 90% stenosis mid LAD - PTCA/birfurcation stenting with MICKIE of mid LAD and 2nd diagonal branch placed and 100% occlusion 2nd diagonal branch - PTCA/MICKIE, 5/2015 EF 40-45% by Echo     Carcinoma in situ of skin      Depression      Hormone replacement therapy     she wants to continue HRT even though she is aware that it increases her lifetime risk of breast cancer.     Ischemic cardiomyopathy      Menopausal syndrome      NSTEMI (non-ST elevated myocardial infarction) (H) 5/21/2015     Osteoporosis 2007     Shortness of breath        PAST SURGICAL HISTORY:  Past Surgical History:   Procedure Laterality Date     BIOPSY OF SKIN LESION       HEART CATH LEFT HEART CATH  5/21/2015    MICKIE to second diagonal branch, bifurcation stent to Mid LAD. Severe 90% stenosis in mid LAD and 100% occlusion of second diagonal branch     ORTHOPEDIC SURGERY  2006    hip replacement       FAMILY HISTORY:  Family History   Problem Relation Age of Onset     Cancer Mother      Coronary Artery Disease Father 77        MI     Breast Cancer Daughter 53        daughter has stage IV breast cancer at age 53       SOCIAL HISTORY:  Social History     Socioeconomic History     Marital status:      Spouse name: None     Number of " children: 2     Years of education: None     Highest education level: None   Occupational History     Occupation: Retired   Social Needs     Financial resource strain: None     Food insecurity:     Worry: None     Inability: None     Transportation needs:     Medical: None     Non-medical: None   Tobacco Use     Smoking status: Never Smoker     Smokeless tobacco: Never Used   Substance and Sexual Activity     Alcohol use: Yes     Alcohol/week: 0.0 standard drinks     Comment: Rare     Drug use: No     Sexual activity: Not Currently     Partners: Male     Birth control/protection: Post-menopausal   Lifestyle     Physical activity:     Days per week: None     Minutes per session: None     Stress: None   Relationships     Social connections:     Talks on phone: None     Gets together: None     Attends Buddhist service: None     Active member of club or organization: None     Attends meetings of clubs or organizations: None     Relationship status: None     Intimate partner violence:     Fear of current or ex partner: None     Emotionally abused: None     Physically abused: None     Forced sexual activity: None   Other Topics Concern     Parent/sibling w/ CABG, MI or angioplasty before 65F 55M? No      Service Not Asked     Blood Transfusions Not Asked     Caffeine Concern No     Comment: no caffeine     Occupational Exposure Not Asked     Hobby Hazards Not Asked     Sleep Concern No     Stress Concern No     Weight Concern Not Asked     Special Diet No     Back Care Not Asked     Exercise Yes     Comment: walking dog, 2 story house, folk dancing     Bike Helmet Not Asked     Seat Belt Yes     Self-Exams Not Asked   Social History Narrative     None       Review of Systems:  Skin:  Positive for hair changes   Eyes:  Positive for glasses  ENT:  Positive for    Respiratory:  Negative    Cardiovascular:    Positive for;edema  Gastroenterology: Negative    Genitourinary:  Negative    Musculoskeletal:  Negative   "  Neurologic:  Negative    Psychiatric:  Negative    Heme/Lymph/Imm:  Negative    Endocrine:  Positive for night sweats;hot flashes    Physical Exam:  Vitals: /64   Pulse 60   Ht 1.562 m (5' 1.5\")   Wt 52.3 kg (115 lb 6.4 oz)   BMI 21.45 kg/m      Constitutional:  cooperative, alert and oriented, well developed, well nourished, in no acute distress        Skin:  warm and dry to the touch, no apparent skin lesions or masses noted          Head:  normocephalic, no masses or lesions        Eyes:  pupils equal and round, conjunctivae and lids unremarkable, sclera white, no xanthalasma, EOMS intact, no nystagmus        Lymph:No Cervical lymphadenopathy present     ENT:  no pallor or cyanosis, dentition good        Neck:  carotid pulses are full and equal bilaterally, JVP normal, no carotid bruit        Respiratory:  normal breath sounds, clear to auscultation, normal A-P diameter, normal symmetry, normal respiratory excursion, no use of accessory muscles         Cardiac: regular rhythm, normal S1/S2, no S3 or S4, apical impulse not displaced, no murmurs, gallops or rubs                pulses full and equal, no bruits auscultated                                        GI:  abdomen soft, non-tender, BS normoactive, no mass, no HSM, no bruits        Extremities and Muscular Skeletal:  no deformities, clubbing, cyanosis, erythema observed         L radial site has firm vashti sized bump. Strong radial pulse, good ROM of wrist and fingers, good capillary refill. Proximal to radial insertion site her arm is diffusley echymotic, especially under/below her elbow    Neurological:  no gross motor deficits        Psych:  Alert and Oriented x 3        Recent Lab Results:  LIPID RESULTS:  Lab Results   Component Value Date    CHOL 117 08/05/2015    HDL 63 08/05/2015    LDL 44 (L) 08/05/2015    TRIG 52 08/05/2015    CHOLHDLRATIO 1.9 08/05/2015       LIVER ENZYME RESULTS:  Lab Results   Component Value Date    AST 24 " 11/21/2016    ALT 31 11/21/2016       CBC RESULTS:  Lab Results   Component Value Date    WBC 6.6 11/23/2016    RBC 3.80 11/23/2016    HGB 12.2 11/23/2016    HCT 36.0 11/23/2016    MCV 95 11/23/2016    MCH 32.1 11/23/2016    MCHC 33.9 11/23/2016    RDW 12.3 11/23/2016     11/23/2016       BMP RESULTS:  Lab Results   Component Value Date     12/05/2016    POTASSIUM 4.0 12/05/2016    CHLORIDE 104 12/05/2016    CO2 32 (H) 12/05/2016    ANIONGAP 9 12/05/2016    GLC 98 12/05/2016    BUN 16 12/05/2016    CR 0.92 12/05/2016    GFRESTIMATED 59 (L) 12/05/2016    GFRESTBLACK 71 12/05/2016    COLTEN 9.5 12/05/2016        A1C RESULTS:  Lab Results   Component Value Date    A1C 5.7 05/21/2015       INR RESULTS:  Lab Results   Component Value Date    INR 1.00 05/21/2015    INR 1.67 (H) 01/06/2006           CC  Soni Blackwell MD  [facility]  [address]  [place] [address]

## 2020-01-29 NOTE — LETTER
1/29/2020    Soni Blackwell MD, MD  [facility] [address]  [facility] [address]    RE: Charlotte Bradshaw       Dear Colleague,    I had the pleasure of seeing Charlotte Bradshaw in the [facility].    HPI and Plan:   It is a pleasure to follow up with Mrs. Bradshaw who has coronary artery disease and a history of mild left ventricular systolic dysfunction from ischemic cardiomyopathy.  The latter condition has resolved.  This is a lady with 2 episodes of acute coronary syndrome.  First one was in 2015.  There was mild left ventricular systolic dysfunction initially.     Coronary angiography revealed 90% stenosis in the mid LAD with 100% stenosis of the second diagonal.  These lesions were treated with bifurcation stenting into both lesions with an excellent angiographic result. In 11/2016 she had recurrent chest discomfort.  Peak troponin was 12.  Now she has a new lesion in the right PDA and this was angioplastied and stented.  On both occasions she had completed a full year of dual antiplatelet therapy.      She has been well in the year since I last saw her.  She denies chest pain, shortness of breath, PND, orthopnea or ankle swelling.  Cardiac examination is unremarkable.      IMPRESSION:   1.  Stable coronary artery disease.   2.  Ischemic cardiomyopathy, now resolved.   3.  Stable blood pressure.   4.  Dyslipidemia, on moderate dose atorvastatin.  Lipid profile 2019 showed LDL 47 HDL 65.  Excellent numbers.   I will see her again in a year's time for further followup.     Orders Placed This Encounter   Procedures     Follow-Up with Cardiologist       No orders of the defined types were placed in this encounter.      Encounter Diagnoses   Name Primary?     ACS (acute coronary syndrome) (H) Yes     Ischemic cardiomyopathy      Coronary artery disease involving native coronary artery of native heart without angina pectoris        CURRENT MEDICATIONS:  Current Outpatient Medications  "  Medication Sig Dispense Refill     ammonium lactate (AMLACTIN) 12 % cream Apply topically daily as needed        Ascorbic Acid (VITAMIN C PO) Take by mouth as needed       aspirin EC 81 MG EC tablet Take 1 tablet (81 mg) by mouth daily 30 tablet 0     atorvastatin (LIPITOR) 40 MG tablet Take 1 tablet (40 mg) by mouth daily 90 tablet 3     B COMPLEX VITAMINS PO Take by mouth daily       lisinopril (PRINIVIL/ZESTRIL) 5 MG tablet Take 1 tablet (5 mg) by mouth daily 90 tablet 3     metoprolol succinate ER (TOPROL-XL) 25 MG 24 hr tablet Take 0.5 tablets (12.5 mg) by mouth daily 45 tablet 3     Multiple Vitamin (MULTI-VITAMINS) TABS Take 1 tablet by mouth       VITAMIN E NATURAL PO Take 400 Units by mouth daily         ALLERGIES     Allergies   Allergen Reactions     Codeine Camsylate Other (See Comments)     \"I get loopy\"       PAST MEDICAL HISTORY:  Past Medical History:   Diagnosis Date     CAD (coronary artery disease) 5/2015    Left Heart cath - 90% stenosis mid LAD - PTCA/birfurcation stenting with MICKIE of mid LAD and 2nd diagonal branch placed and 100% occlusion 2nd diagonal branch - PTCA/MICKIE, 5/2015 EF 40-45% by Echo     Carcinoma in situ of skin      Depression      Hormone replacement therapy     she wants to continue HRT even though she is aware that it increases her lifetime risk of breast cancer.     Ischemic cardiomyopathy      Menopausal syndrome      NSTEMI (non-ST elevated myocardial infarction) (H) 5/21/2015     Osteoporosis 2007     Shortness of breath        PAST SURGICAL HISTORY:  Past Surgical History:   Procedure Laterality Date     BIOPSY OF SKIN LESION       HEART CATH LEFT HEART CATH  5/21/2015    MICKIE to second diagonal branch, bifurcation stent to Mid LAD. Severe 90% stenosis in mid LAD and 100% occlusion of second diagonal branch     ORTHOPEDIC SURGERY  2006    hip replacement       FAMILY HISTORY:  Family History   Problem Relation Age of Onset     Cancer Mother      Coronary Artery Disease " Father 77        MI     Breast Cancer Daughter 53        daughter has stage IV breast cancer at age 53       SOCIAL HISTORY:  Social History     Socioeconomic History     Marital status:      Spouse name: None     Number of children: 2     Years of education: None     Highest education level: None   Occupational History     Occupation: Retired   Social Needs     Financial resource strain: None     Food insecurity:     Worry: None     Inability: None     Transportation needs:     Medical: None     Non-medical: None   Tobacco Use     Smoking status: Never Smoker     Smokeless tobacco: Never Used   Substance and Sexual Activity     Alcohol use: Yes     Alcohol/week: 0.0 standard drinks     Comment: Rare     Drug use: No     Sexual activity: Not Currently     Partners: Male     Birth control/protection: Post-menopausal   Lifestyle     Physical activity:     Days per week: None     Minutes per session: None     Stress: None   Relationships     Social connections:     Talks on phone: None     Gets together: None     Attends Taoism service: None     Active member of club or organization: None     Attends meetings of clubs or organizations: None     Relationship status: None     Intimate partner violence:     Fear of current or ex partner: None     Emotionally abused: None     Physically abused: None     Forced sexual activity: None   Other Topics Concern     Parent/sibling w/ CABG, MI or angioplasty before 65F 55M? No      Service Not Asked     Blood Transfusions Not Asked     Caffeine Concern No     Comment: no caffeine     Occupational Exposure Not Asked     Hobby Hazards Not Asked     Sleep Concern No     Stress Concern No     Weight Concern Not Asked     Special Diet No     Back Care Not Asked     Exercise Yes     Comment: walking dog, 2 story house, folk dancing     Bike Helmet Not Asked     Seat Belt Yes     Self-Exams Not Asked   Social History Narrative     None       Review of Systems:  Skin:   "Positive for hair changes   Eyes:  Positive for glasses  ENT:  Positive for    Respiratory:  Negative    Cardiovascular:    Positive for;edema  Gastroenterology: Negative    Genitourinary:  Negative    Musculoskeletal:  Negative    Neurologic:  Negative    Psychiatric:  Negative    Heme/Lymph/Imm:  Negative    Endocrine:  Positive for night sweats;hot flashes    Physical Exam:  Vitals: /64   Pulse 60   Ht 1.562 m (5' 1.5\")   Wt 52.3 kg (115 lb 6.4 oz)   BMI 21.45 kg/m       Constitutional:  cooperative, alert and oriented, well developed, well nourished, in no acute distress        Skin:  warm and dry to the touch, no apparent skin lesions or masses noted          Head:  normocephalic, no masses or lesions        Eyes:  pupils equal and round, conjunctivae and lids unremarkable, sclera white, no xanthalasma, EOMS intact, no nystagmus        Lymph:No Cervical lymphadenopathy present     ENT:  no pallor or cyanosis, dentition good        Neck:  carotid pulses are full and equal bilaterally, JVP normal, no carotid bruit        Respiratory:  normal breath sounds, clear to auscultation, normal A-P diameter, normal symmetry, normal respiratory excursion, no use of accessory muscles         Cardiac: regular rhythm, normal S1/S2, no S3 or S4, apical impulse not displaced, no murmurs, gallops or rubs                pulses full and equal, no bruits auscultated                                        GI:  abdomen soft, non-tender, BS normoactive, no mass, no HSM, no bruits        Extremities and Muscular Skeletal:  no deformities, clubbing, cyanosis, erythema observed         L radial site has firm vashti sized bump. Strong radial pulse, good ROM of wrist and fingers, good capillary refill. Proximal to radial insertion site her arm is diffusley echymotic, especially under/below her elbow    Neurological:  no gross motor deficits        Psych:  Alert and Oriented x 3        Recent Lab Results:  LIPID RESULTS:  Lab " Results   Component Value Date    CHOL 117 08/05/2015    HDL 63 08/05/2015    LDL 44 (L) 08/05/2015    TRIG 52 08/05/2015    CHOLHDLRATIO 1.9 08/05/2015       LIVER ENZYME RESULTS:  Lab Results   Component Value Date    AST 24 11/21/2016    ALT 31 11/21/2016       CBC RESULTS:  Lab Results   Component Value Date    WBC 6.6 11/23/2016    RBC 3.80 11/23/2016    HGB 12.2 11/23/2016    HCT 36.0 11/23/2016    MCV 95 11/23/2016    MCH 32.1 11/23/2016    MCHC 33.9 11/23/2016    RDW 12.3 11/23/2016     11/23/2016       BMP RESULTS:  Lab Results   Component Value Date     12/05/2016    POTASSIUM 4.0 12/05/2016    CHLORIDE 104 12/05/2016    CO2 32 (H) 12/05/2016    ANIONGAP 9 12/05/2016    GLC 98 12/05/2016    BUN 16 12/05/2016    CR 0.92 12/05/2016    GFRESTIMATED 59 (L) 12/05/2016    GFRESTBLACK 71 12/05/2016    COLTEN 9.5 12/05/2016        A1C RESULTS:  Lab Results   Component Value Date    A1C 5.7 05/21/2015       INR RESULTS:  Lab Results   Component Value Date    INR 1.00 05/21/2015    INR 1.67 (H) 01/06/2006           Thank you for allowing me to participate in the care of your patient.    Sincerely,     DR AVA MORALES MD     [facility]

## 2020-01-29 NOTE — LETTER
1/29/2020    Soni Blackwell MD, MD  [facility] [address]  [facility] [address]    RE: Charlotte Bradshaw       Dear Colleague,    I had the pleasure of seeing Charlotte Bradshaw in the [facility].    HPI and Plan:   It is a pleasure to follow up with Mrs. Bradshaw who has coronary artery disease and a history of mild left ventricular systolic dysfunction from ischemic cardiomyopathy.  The latter condition has resolved.  This is a lady with 2 episodes of acute coronary syndrome.  First one was in 2015.  There was mild left ventricular systolic dysfunction initially.     Coronary angiography revealed 90% stenosis in the mid LAD with 100% stenosis of the second diagonal.  These lesions were treated with bifurcation stenting into both lesions with an excellent angiographic result. In 11/2016 she had recurrent chest discomfort.  Peak troponin was 12.  Now she has a new lesion in the right PDA and this was angioplastied and stented.  On both occasions she had completed a full year of dual antiplatelet therapy.      She has been well in the year since I last saw her.  She denies chest pain, shortness of breath, PND, orthopnea or ankle swelling.  Cardiac examination is unremarkable.      IMPRESSION:   1.  Stable coronary artery disease.   2.  Ischemic cardiomyopathy, now resolved.   3.  Stable blood pressure.   4.  Dyslipidemia, on moderate dose atorvastatin.  Lipid profile 2019 showed LDL 47 HDL 65.  Excellent numbers.   I will see her again in a year's time for further followup.     Orders Placed This Encounter   Procedures     Follow-Up with Cardiologist       No orders of the defined types were placed in this encounter.      Encounter Diagnoses   Name Primary?     ACS (acute coronary syndrome) (H) Yes     Ischemic cardiomyopathy      Coronary artery disease involving native coronary artery of native heart without angina pectoris        CURRENT MEDICATIONS:  Current Outpatient Medications  "  Medication Sig Dispense Refill     ammonium lactate (AMLACTIN) 12 % cream Apply topically daily as needed        Ascorbic Acid (VITAMIN C PO) Take by mouth as needed       aspirin EC 81 MG EC tablet Take 1 tablet (81 mg) by mouth daily 30 tablet 0     atorvastatin (LIPITOR) 40 MG tablet Take 1 tablet (40 mg) by mouth daily 90 tablet 3     B COMPLEX VITAMINS PO Take by mouth daily       lisinopril (PRINIVIL/ZESTRIL) 5 MG tablet Take 1 tablet (5 mg) by mouth daily 90 tablet 3     metoprolol succinate ER (TOPROL-XL) 25 MG 24 hr tablet Take 0.5 tablets (12.5 mg) by mouth daily 45 tablet 3     Multiple Vitamin (MULTI-VITAMINS) TABS Take 1 tablet by mouth       VITAMIN E NATURAL PO Take 400 Units by mouth daily         ALLERGIES     Allergies   Allergen Reactions     Codeine Camsylate Other (See Comments)     \"I get loopy\"       PAST MEDICAL HISTORY:  Past Medical History:   Diagnosis Date     CAD (coronary artery disease) 5/2015    Left Heart cath - 90% stenosis mid LAD - PTCA/birfurcation stenting with MICKIE of mid LAD and 2nd diagonal branch placed and 100% occlusion 2nd diagonal branch - PTCA/MICKIE, 5/2015 EF 40-45% by Echo     Carcinoma in situ of skin      Depression      Hormone replacement therapy     she wants to continue HRT even though she is aware that it increases her lifetime risk of breast cancer.     Ischemic cardiomyopathy      Menopausal syndrome      NSTEMI (non-ST elevated myocardial infarction) (H) 5/21/2015     Osteoporosis 2007     Shortness of breath        PAST SURGICAL HISTORY:  Past Surgical History:   Procedure Laterality Date     BIOPSY OF SKIN LESION       HEART CATH LEFT HEART CATH  5/21/2015    MICKIE to second diagonal branch, bifurcation stent to Mid LAD. Severe 90% stenosis in mid LAD and 100% occlusion of second diagonal branch     ORTHOPEDIC SURGERY  2006    hip replacement       FAMILY HISTORY:  Family History   Problem Relation Age of Onset     Cancer Mother      Coronary Artery Disease " Father 77        MI     Breast Cancer Daughter 53        daughter has stage IV breast cancer at age 53       SOCIAL HISTORY:  Social History     Socioeconomic History     Marital status:      Spouse name: None     Number of children: 2     Years of education: None     Highest education level: None   Occupational History     Occupation: Retired   Social Needs     Financial resource strain: None     Food insecurity:     Worry: None     Inability: None     Transportation needs:     Medical: None     Non-medical: None   Tobacco Use     Smoking status: Never Smoker     Smokeless tobacco: Never Used   Substance and Sexual Activity     Alcohol use: Yes     Alcohol/week: 0.0 standard drinks     Comment: Rare     Drug use: No     Sexual activity: Not Currently     Partners: Male     Birth control/protection: Post-menopausal   Lifestyle     Physical activity:     Days per week: None     Minutes per session: None     Stress: None   Relationships     Social connections:     Talks on phone: None     Gets together: None     Attends Congregational service: None     Active member of club or organization: None     Attends meetings of clubs or organizations: None     Relationship status: None     Intimate partner violence:     Fear of current or ex partner: None     Emotionally abused: None     Physically abused: None     Forced sexual activity: None   Other Topics Concern     Parent/sibling w/ CABG, MI or angioplasty before 65F 55M? No      Service Not Asked     Blood Transfusions Not Asked     Caffeine Concern No     Comment: no caffeine     Occupational Exposure Not Asked     Hobby Hazards Not Asked     Sleep Concern No     Stress Concern No     Weight Concern Not Asked     Special Diet No     Back Care Not Asked     Exercise Yes     Comment: walking dog, 2 story house, folk dancing     Bike Helmet Not Asked     Seat Belt Yes     Self-Exams Not Asked   Social History Narrative     None       Review of Systems:  Skin:   "Positive for hair changes   Eyes:  Positive for glasses  ENT:  Positive for    Respiratory:  Negative    Cardiovascular:    Positive for;edema  Gastroenterology: Negative    Genitourinary:  Negative    Musculoskeletal:  Negative    Neurologic:  Negative    Psychiatric:  Negative    Heme/Lymph/Imm:  Negative    Endocrine:  Positive for night sweats;hot flashes    Physical Exam:  Vitals: /64   Pulse 60   Ht 1.562 m (5' 1.5\")   Wt 52.3 kg (115 lb 6.4 oz)   BMI 21.45 kg/m       Constitutional:  cooperative, alert and oriented, well developed, well nourished, in no acute distress        Skin:  warm and dry to the touch, no apparent skin lesions or masses noted          Head:  normocephalic, no masses or lesions        Eyes:  pupils equal and round, conjunctivae and lids unremarkable, sclera white, no xanthalasma, EOMS intact, no nystagmus        Lymph:No Cervical lymphadenopathy present     ENT:  no pallor or cyanosis, dentition good        Neck:  carotid pulses are full and equal bilaterally, JVP normal, no carotid bruit        Respiratory:  normal breath sounds, clear to auscultation, normal A-P diameter, normal symmetry, normal respiratory excursion, no use of accessory muscles         Cardiac: regular rhythm, normal S1/S2, no S3 or S4, apical impulse not displaced, no murmurs, gallops or rubs                pulses full and equal, no bruits auscultated                                        GI:  abdomen soft, non-tender, BS normoactive, no mass, no HSM, no bruits        Extremities and Muscular Skeletal:  no deformities, clubbing, cyanosis, erythema observed         L radial site has firm vashti sized bump. Strong radial pulse, good ROM of wrist and fingers, good capillary refill. Proximal to radial insertion site her arm is diffusley echymotic, especially under/below her elbow    Neurological:  no gross motor deficits        Psych:  Alert and Oriented x 3        Recent Lab Results:  LIPID RESULTS:  Lab " Results   Component Value Date    CHOL 117 08/05/2015    HDL 63 08/05/2015    LDL 44 (L) 08/05/2015    TRIG 52 08/05/2015    CHOLHDLRATIO 1.9 08/05/2015       LIVER ENZYME RESULTS:  Lab Results   Component Value Date    AST 24 11/21/2016    ALT 31 11/21/2016       CBC RESULTS:  Lab Results   Component Value Date    WBC 6.6 11/23/2016    RBC 3.80 11/23/2016    HGB 12.2 11/23/2016    HCT 36.0 11/23/2016    MCV 95 11/23/2016    MCH 32.1 11/23/2016    MCHC 33.9 11/23/2016    RDW 12.3 11/23/2016     11/23/2016       BMP RESULTS:  Lab Results   Component Value Date     12/05/2016    POTASSIUM 4.0 12/05/2016    CHLORIDE 104 12/05/2016    CO2 32 (H) 12/05/2016    ANIONGAP 9 12/05/2016    GLC 98 12/05/2016    BUN 16 12/05/2016    CR 0.92 12/05/2016    GFRESTIMATED 59 (L) 12/05/2016    GFRESTBLACK 71 12/05/2016    COLTEN 9.5 12/05/2016        A1C RESULTS:  Lab Results   Component Value Date    A1C 5.7 05/21/2015       INR RESULTS:  Lab Results   Component Value Date    INR 1.00 05/21/2015    INR 1.67 (H) 01/06/2006           CC  Soni Blackwell MD  [facility] [address] [place], [place] [id number]                  Thank you for allowing me to participate in the care of your patient.      Sincerely,     DR AVA MORALES MD     [facility]    cc:   Soni Blackwell MD  [facility]  [address] [place], [place] [id number]

## 2020-03-25 DIAGNOSIS — I21.4 NSTEMI (NON-ST ELEVATED MYOCARDIAL INFARCTION) (H): ICD-10-CM

## 2020-03-25 RX ORDER — METOPROLOL SUCCINATE 25 MG/1
12.5 TABLET, EXTENDED RELEASE ORAL DAILY
Qty: 45 TABLET | Refills: 3 | Status: SHIPPED | OUTPATIENT
Start: 2020-03-25 | End: 2021-06-07

## 2020-03-25 RX ORDER — LISINOPRIL 5 MG/1
5 TABLET ORAL DAILY
Qty: 90 TABLET | Refills: 3 | Status: SHIPPED | OUTPATIENT
Start: 2020-03-25 | End: 2021-11-04

## 2020-04-21 DIAGNOSIS — I24.9 ACS (ACUTE CORONARY SYNDROME) (H): ICD-10-CM

## 2020-04-21 RX ORDER — ATORVASTATIN CALCIUM 40 MG/1
40 TABLET, FILM COATED ORAL DAILY
Qty: 90 TABLET | Refills: 2 | Status: SHIPPED | OUTPATIENT
Start: 2020-04-21 | End: 2021-07-05

## 2021-02-02 ENCOUNTER — OFFICE VISIT (OUTPATIENT)
Dept: CARDIOLOGY | Facility: CLINIC | Age: 86
End: 2021-02-02
Payer: COMMERCIAL

## 2021-02-02 VITALS
HEART RATE: 61 BPM | SYSTOLIC BLOOD PRESSURE: 132 MMHG | WEIGHT: 114 LBS | BODY MASS INDEX: 20.98 KG/M2 | DIASTOLIC BLOOD PRESSURE: 70 MMHG | HEIGHT: 62 IN

## 2021-02-02 DIAGNOSIS — I24.9 ACS (ACUTE CORONARY SYNDROME) (H): Primary | ICD-10-CM

## 2021-02-02 PROCEDURE — 99213 OFFICE O/P EST LOW 20 MIN: CPT | Performed by: INTERNAL MEDICINE

## 2021-02-02 ASSESSMENT — MIFFLIN-ST. JEOR: SCORE: 915.35

## 2021-02-02 NOTE — LETTER
2/2/2021    Soni Blackwell MD, MD  [employer] [facility] [address]  [place] [address]    RE: Charlotte Bradshaw       Dear Colleague,    I had the pleasure of seeing Charlotte Bradshaw in the [facility].    HPI and Plan:   It is a pleasure to follow up with Mrs. Bradshaw who has coronary artery disease and a history of mild left ventricular systolic dysfunction from ischemic cardiomyopathy.  The latter condition has resolved.  This is a lady with 2 episodes of acute coronary syndrome.  First one was in 2015.  There was mild left ventricular systolic dysfunction initially.    Coronary angiography revealed 90% stenosis in the mid LAD with 100% stenosis of the second diagonal.  These lesions were treated with bifurcation stenting into both lesions with an excellent angiographic result. In 11/2016 she had recurrent chest discomfort.  Peak troponin was 12.  Now she has a new lesion in the right PDA and this was angioplastied and stented.  On both occasions she had completed a full year of dual antiplatelet therapy.       I am saddened to hear the news that her devoted  of more than 40 years passed away in September 2020 after a fall.  She was tearful and obviously very sad about it.  I offered her my condolences.  I am happy to hear that physically she is holding up.  She denies cardiac symptoms and all medications are well-tolerated.  Physical examination cardiovascular system continues to be unremarkable.    IMPRESSION:   1.  Stable coronary artery disease.   2.  Ischemic cardiomyopathy, now resolved.   3.  Stable blood pressure.   4.  Dyslipidemia, on moderate dose atorvastatin.  Lipid profile 2019 showed LDL 47 HDL 65.  These have not been checked recently.  If they are checked when I see her next year I will check them.     She asked me whether her medications to be discontinued.  Her blood pressure is lower on the high side and I advised her that she should continue with all current  "medications.  She has concerns about cost and I reassured her that all medications that she takes from my point of view are generic.    Orders Placed This Encounter   Procedures     Follow-Up with Cardiologist       No orders of the defined types were placed in this encounter.      Encounter Diagnosis   Name Primary?     ACS (acute coronary syndrome) (H) Yes       CURRENT MEDICATIONS:  Current Outpatient Medications   Medication Sig Dispense Refill     ammonium lactate (AMLACTIN) 12 % cream Apply topically daily as needed        Ascorbic Acid (VITAMIN C PO) Take by mouth as needed       aspirin EC 81 MG EC tablet Take 1 tablet (81 mg) by mouth daily (Patient taking differently: Take 81 mg by mouth ) 30 tablet 0     atorvastatin (LIPITOR) 40 MG tablet Take 1 tablet (40 mg) by mouth daily 90 tablet 2     B COMPLEX VITAMINS PO Take by mouth daily       lisinopril (ZESTRIL) 5 MG tablet Take 1 tablet (5 mg) by mouth daily 90 tablet 3     metoprolol succinate ER (TOPROL-XL) 25 MG 24 hr tablet Take 0.5 tablets (12.5 mg) by mouth daily 45 tablet 3     Multiple Vitamin (MULTI-VITAMINS) TABS Take 1 tablet by mouth       VITAMIN E NATURAL PO Take 400 Units by mouth daily         ALLERGIES     Allergies   Allergen Reactions     Codeine Camsylate Other (See Comments)     \"I get loopy\"       PAST MEDICAL HISTORY:  Past Medical History:   Diagnosis Date     CAD (coronary artery disease) 5/2015    Left Heart cath - 90% stenosis mid LAD - PTCA/birfurcation stenting with MICKIE of mid LAD and 2nd diagonal branch placed and 100% occlusion 2nd diagonal branch - PTCA/MICKIE, 5/2015 EF 40-45% by Echo     Carcinoma in situ of skin      Depression      Hormone replacement therapy     she wants to continue HRT even though she is aware that it increases her lifetime risk of breast cancer.     Ischemic cardiomyopathy      Menopausal syndrome      NSTEMI (non-ST elevated myocardial infarction) (H) 5/21/2015     Osteoporosis 2007     Shortness of " breath        PAST SURGICAL HISTORY:  Past Surgical History:   Procedure Laterality Date     BIOPSY OF SKIN LESION       HEART CATH LEFT HEART CATH  5/21/2015    MICKIE to second diagonal branch, bifurcation stent to Mid LAD. Severe 90% stenosis in mid LAD and 100% occlusion of second diagonal branch     ORTHOPEDIC SURGERY  2006    hip replacement       FAMILY HISTORY:  Family History   Problem Relation Age of Onset     Cancer Mother      Coronary Artery Disease Father 77        MI     Breast Cancer Daughter 53        daughter has stage IV breast cancer at age 53       SOCIAL HISTORY:  Social History     Socioeconomic History     Marital status:      Spouse name: None     Number of children: 2     Years of education: None     Highest education level: None   Occupational History     Occupation: Retired   Social Needs     Financial resource strain: None     Food insecurity     Worry: None     Inability: None     Transportation needs     Medical: None     Non-medical: None   Tobacco Use     Smoking status: Never Smoker     Smokeless tobacco: Never Used   Substance and Sexual Activity     Alcohol use: Yes     Alcohol/week: 0.0 standard drinks     Comment: Rare     Drug use: No     Sexual activity: Not Currently     Partners: Male     Birth control/protection: Post-menopausal   Lifestyle     Physical activity     Days per week: None     Minutes per session: None     Stress: None   Relationships     Social connections     Talks on phone: None     Gets together: None     Attends Gnosticist service: None     Active member of club or organization: None     Attends meetings of clubs or organizations: None     Relationship status: None     Intimate partner violence     Fear of current or ex partner: None     Emotionally abused: None     Physically abused: None     Forced sexual activity: None   Other Topics Concern     Parent/sibling w/ CABG, MI or angioplasty before 65F 55M? No      Service Not Asked     Blood  "Transfusions Not Asked     Caffeine Concern No     Comment: no caffeine     Occupational Exposure Not Asked     Hobby Hazards Not Asked     Sleep Concern No     Stress Concern No     Weight Concern Not Asked     Special Diet No     Back Care Not Asked     Exercise Yes     Comment: walking dog, 2 story house, folk dancing     Bike Helmet Not Asked     Seat Belt Yes     Self-Exams Not Asked   Social History Narrative     None       Review of Systems:  Skin:  Negative     Eyes:  Positive for glasses  ENT:  Positive for tinnitus  Respiratory:  Negative    Cardiovascular:  Negative edema;Positive for  Gastroenterology: Negative    Genitourinary:  Negative    Musculoskeletal:  Negative    Neurologic:  Negative    Psychiatric:  Negative    Heme/Lymph/Imm:  Negative    Endocrine:  Negative      Physical Exam:  Vitals: /70   Pulse 61   Ht 1.575 m (5' 2\")   Wt 51.7 kg (114 lb)   BMI 20.85 kg/m      Constitutional:  cooperative, alert and oriented, well developed, well nourished, in no acute distress        Skin:  warm and dry to the touch, no apparent skin lesions or masses noted          Head:  normocephalic, no masses or lesions        Eyes:  pupils equal and round, conjunctivae and lids unremarkable, sclera white, no xanthalasma, EOMS intact, no nystagmus        Lymph:No Cervical lymphadenopathy present     ENT:  no pallor or cyanosis, dentition good        Neck:  carotid pulses are full and equal bilaterally, JVP normal, no carotid bruit        Respiratory:  normal breath sounds, clear to auscultation, normal A-P diameter, normal symmetry, normal respiratory excursion, no use of accessory muscles         Cardiac: regular rhythm, normal S1/S2, no S3 or S4, apical impulse not displaced, no murmurs, gallops or rubs                pulses full and equal, no bruits auscultated                                        GI:  abdomen soft, non-tender, BS normoactive, no mass, no HSM, no bruits        Extremities and " Muscular Skeletal:  no deformities, clubbing, cyanosis, erythema observed         L radial site has firm vashti sized bump. Strong radial pulse, good ROM of wrist and fingers, good capillary refill. Proximal to radial insertion site her arm is diffusley echymotic, especially under/below her elbow    Neurological:  no gross motor deficits        Psych:  Alert and Oriented x 3        Recent Lab Results:  LIPID RESULTS:  Lab Results   Component Value Date    CHOL 117 08/05/2015    HDL 63 08/05/2015    LDL 44 (L) 08/05/2015    TRIG 52 08/05/2015    CHOLHDLRATIO 1.9 08/05/2015       LIVER ENZYME RESULTS:  Lab Results   Component Value Date    AST 24 11/21/2016    ALT 31 11/21/2016       CBC RESULTS:  Lab Results   Component Value Date    WBC 6.6 11/23/2016    RBC 3.80 11/23/2016    HGB 12.2 11/23/2016    HCT 36.0 11/23/2016    MCV 95 11/23/2016    MCH 32.1 11/23/2016    MCHC 33.9 11/23/2016    RDW 12.3 11/23/2016     11/23/2016       BMP RESULTS:  Lab Results   Component Value Date     12/05/2016    POTASSIUM 4.0 12/05/2016    CHLORIDE 104 12/05/2016    CO2 32 (H) 12/05/2016    ANIONGAP 9 12/05/2016    GLC 98 12/05/2016    BUN 16 12/05/2016    CR 0.92 12/05/2016    GFRESTIMATED 59 (L) 12/05/2016    GFRESTBLACK 71 12/05/2016    COLTEN 9.5 12/05/2016        A1C RESULTS:  Lab Results   Component Value Date    A1C 5.7 05/21/2015       INR RESULTS:  Lab Results   Component Value Date    INR 1.00 05/21/2015    INR 1.67 (H) 01/06/2006         Thank you for allowing me to participate in the care of your patient.    Sincerely,     DR AVA MORALES MD     [facility]

## 2021-02-02 NOTE — PROGRESS NOTES
HPI and Plan:   It is a pleasure to follow up with Mrs. Bradshaw who has coronary artery disease and a history of mild left ventricular systolic dysfunction from ischemic cardiomyopathy.  The latter condition has resolved.  This is a lady with 2 episodes of acute coronary syndrome.  First one was in 2015.  There was mild left ventricular systolic dysfunction initially.    Coronary angiography revealed 90% stenosis in the mid LAD with 100% stenosis of the second diagonal.  These lesions were treated with bifurcation stenting into both lesions with an excellent angiographic result. In 11/2016 she had recurrent chest discomfort.  Peak troponin was 12.  Now she has a new lesion in the right PDA and this was angioplastied and stented.  On both occasions she had completed a full year of dual antiplatelet therapy.       I am saddened to hear the news that her devoted  of more than 40 years passed away in September 2020 after a fall.  She was tearful and obviously very sad about it.  I offered her my condolences.  I am happy to hear that physically she is holding up.  She denies cardiac symptoms and all medications are well-tolerated.  Physical examination cardiovascular system continues to be unremarkable.    IMPRESSION:   1.  Stable coronary artery disease.   2.  Ischemic cardiomyopathy, now resolved.   3.  Stable blood pressure.   4.  Dyslipidemia, on moderate dose atorvastatin.  Lipid profile 2019 showed LDL 47 HDL 65.  These have not been checked recently.  If they are checked when I see her next year I will check them.     She asked me whether her medications to be discontinued.  Her blood pressure is lower on the high side and I advised her that she should continue with all current medications.  She has concerns about cost and I reassured her that all medications that she takes from my point of view are generic.    Orders Placed This Encounter   Procedures     Follow-Up with Cardiologist       No orders of the  "defined types were placed in this encounter.      Encounter Diagnosis   Name Primary?     ACS (acute coronary syndrome) (H) Yes       CURRENT MEDICATIONS:  Current Outpatient Medications   Medication Sig Dispense Refill     ammonium lactate (AMLACTIN) 12 % cream Apply topically daily as needed        Ascorbic Acid (VITAMIN C PO) Take by mouth as needed       aspirin EC 81 MG EC tablet Take 1 tablet (81 mg) by mouth daily (Patient taking differently: Take 81 mg by mouth ) 30 tablet 0     atorvastatin (LIPITOR) 40 MG tablet Take 1 tablet (40 mg) by mouth daily 90 tablet 2     B COMPLEX VITAMINS PO Take by mouth daily       lisinopril (ZESTRIL) 5 MG tablet Take 1 tablet (5 mg) by mouth daily 90 tablet 3     metoprolol succinate ER (TOPROL-XL) 25 MG 24 hr tablet Take 0.5 tablets (12.5 mg) by mouth daily 45 tablet 3     Multiple Vitamin (MULTI-VITAMINS) TABS Take 1 tablet by mouth       VITAMIN E NATURAL PO Take 400 Units by mouth daily         ALLERGIES     Allergies   Allergen Reactions     Codeine Camsylate Other (See Comments)     \"I get loopy\"       PAST MEDICAL HISTORY:  Past Medical History:   Diagnosis Date     CAD (coronary artery disease) 5/2015    Left Heart cath - 90% stenosis mid LAD - PTCA/birfurcation stenting with MICKIE of mid LAD and 2nd diagonal branch placed and 100% occlusion 2nd diagonal branch - PTCA/MICKIE, 5/2015 EF 40-45% by Echo     Carcinoma in situ of skin      Depression      Hormone replacement therapy     she wants to continue HRT even though she is aware that it increases her lifetime risk of breast cancer.     Ischemic cardiomyopathy      Menopausal syndrome      NSTEMI (non-ST elevated myocardial infarction) (H) 5/21/2015     Osteoporosis 2007     Shortness of breath        PAST SURGICAL HISTORY:  Past Surgical History:   Procedure Laterality Date     BIOPSY OF SKIN LESION       HEART CATH LEFT HEART CATH  5/21/2015    MICKIE to second diagonal branch, bifurcation stent to Mid LAD. Severe 90% " stenosis in mid LAD and 100% occlusion of second diagonal branch     ORTHOPEDIC SURGERY  2006    hip replacement       FAMILY HISTORY:  Family History   Problem Relation Age of Onset     Cancer Mother      Coronary Artery Disease Father 77        MI     Breast Cancer Daughter 53        daughter has stage IV breast cancer at age 53       SOCIAL HISTORY:  Social History     Socioeconomic History     Marital status:      Spouse name: None     Number of children: 2     Years of education: None     Highest education level: None   Occupational History     Occupation: Retired   Social Needs     Financial resource strain: None     Food insecurity     Worry: None     Inability: None     Transportation needs     Medical: None     Non-medical: None   Tobacco Use     Smoking status: Never Smoker     Smokeless tobacco: Never Used   Substance and Sexual Activity     Alcohol use: Yes     Alcohol/week: 0.0 standard drinks     Comment: Rare     Drug use: No     Sexual activity: Not Currently     Partners: Male     Birth control/protection: Post-menopausal   Lifestyle     Physical activity     Days per week: None     Minutes per session: None     Stress: None   Relationships     Social connections     Talks on phone: None     Gets together: None     Attends Hinduism service: None     Active member of club or organization: None     Attends meetings of clubs or organizations: None     Relationship status: None     Intimate partner violence     Fear of current or ex partner: None     Emotionally abused: None     Physically abused: None     Forced sexual activity: None   Other Topics Concern     Parent/sibling w/ CABG, MI or angioplasty before 65F 55M? No      Service Not Asked     Blood Transfusions Not Asked     Caffeine Concern No     Comment: no caffeine     Occupational Exposure Not Asked     Hobby Hazards Not Asked     Sleep Concern No     Stress Concern No     Weight Concern Not Asked     Special Diet No     Back  "Care Not Asked     Exercise Yes     Comment: walking dog, 2 story house, folk dancing     Bike Helmet Not Asked     Seat Belt Yes     Self-Exams Not Asked   Social History Narrative     None       Review of Systems:  Skin:  Negative     Eyes:  Positive for glasses  ENT:  Positive for tinnitus  Respiratory:  Negative    Cardiovascular:  Negative edema;Positive for  Gastroenterology: Negative    Genitourinary:  Negative    Musculoskeletal:  Negative    Neurologic:  Negative    Psychiatric:  Negative    Heme/Lymph/Imm:  Negative    Endocrine:  Negative      Physical Exam:  Vitals: /70   Pulse 61   Ht 1.575 m (5' 2\")   Wt 51.7 kg (114 lb)   BMI 20.85 kg/m      Constitutional:  cooperative, alert and oriented, well developed, well nourished, in no acute distress        Skin:  warm and dry to the touch, no apparent skin lesions or masses noted          Head:  normocephalic, no masses or lesions        Eyes:  pupils equal and round, conjunctivae and lids unremarkable, sclera white, no xanthalasma, EOMS intact, no nystagmus        Lymph:No Cervical lymphadenopathy present     ENT:  no pallor or cyanosis, dentition good        Neck:  carotid pulses are full and equal bilaterally, JVP normal, no carotid bruit        Respiratory:  normal breath sounds, clear to auscultation, normal A-P diameter, normal symmetry, normal respiratory excursion, no use of accessory muscles         Cardiac: regular rhythm, normal S1/S2, no S3 or S4, apical impulse not displaced, no murmurs, gallops or rubs                pulses full and equal, no bruits auscultated                                        GI:  abdomen soft, non-tender, BS normoactive, no mass, no HSM, no bruits        Extremities and Muscular Skeletal:  no deformities, clubbing, cyanosis, erythema observed         L radial site has firm vashti sized bump. Strong radial pulse, good ROM of wrist and fingers, good capillary refill. Proximal to radial insertion site her arm is " diffusley echymotic, especially under/below her elbow    Neurological:  no gross motor deficits        Psych:  Alert and Oriented x 3        Recent Lab Results:  LIPID RESULTS:  Lab Results   Component Value Date    CHOL 117 08/05/2015    HDL 63 08/05/2015    LDL 44 (L) 08/05/2015    TRIG 52 08/05/2015    CHOLHDLRATIO 1.9 08/05/2015       LIVER ENZYME RESULTS:  Lab Results   Component Value Date    AST 24 11/21/2016    ALT 31 11/21/2016       CBC RESULTS:  Lab Results   Component Value Date    WBC 6.6 11/23/2016    RBC 3.80 11/23/2016    HGB 12.2 11/23/2016    HCT 36.0 11/23/2016    MCV 95 11/23/2016    MCH 32.1 11/23/2016    MCHC 33.9 11/23/2016    RDW 12.3 11/23/2016     11/23/2016       BMP RESULTS:  Lab Results   Component Value Date     12/05/2016    POTASSIUM 4.0 12/05/2016    CHLORIDE 104 12/05/2016    CO2 32 (H) 12/05/2016    ANIONGAP 9 12/05/2016    GLC 98 12/05/2016    BUN 16 12/05/2016    CR 0.92 12/05/2016    GFRESTIMATED 59 (L) 12/05/2016    GFRESTBLACK 71 12/05/2016    COLTEN 9.5 12/05/2016        A1C RESULTS:  Lab Results   Component Value Date    A1C 5.7 05/21/2015       INR RESULTS:  Lab Results   Component Value Date    INR 1.00 05/21/2015    INR 1.67 (H) 01/06/2006           CC  No referring provider defined for this encounter.

## 2021-03-19 ENCOUNTER — ANCILLARY PROCEDURE (OUTPATIENT)
Dept: MAMMOGRAPHY | Facility: CLINIC | Age: 86
End: 2021-03-19
Attending: OBSTETRICS & GYNECOLOGY
Payer: COMMERCIAL

## 2021-03-19 DIAGNOSIS — Z12.31 VISIT FOR SCREENING MAMMOGRAM: ICD-10-CM

## 2021-03-19 PROCEDURE — 77067 SCR MAMMO BI INCL CAD: CPT | Mod: TC | Performed by: RADIOLOGY

## 2021-04-05 NOTE — PROGRESS NOTES
"  Charlotte is a 85 year old  female who presents for Medicare Limited exam.     Do you have a Health Care Directive?: {HEALTHCARE DIRECTIVE STATUS:552389}    Fall risk:   {Fall Risk for Medicare Annual Wellness Visit:099727::\"Fall Risk Assessment completed per order.\"}    HPI :  ***    GYNECOLOGIC HISTORY:  No LMP recorded. Patient is postmenopausal..   reports that she has never smoked. She has never used smokeless tobacco.  {Tobacco Cessation -- Delete if patient is a non-smoker:676496}  STD testing offered?  {GC/CHLAMYDIA:409887}  Last PHQ-9 score on record=   PHQ-9 SCORE 3/12/2018   PHQ-9 Total Score 1     Last GAD7 score on record=   SCOOBY-7 SCORE 3/7/2016 3/8/2017 3/12/2018   Total Score 6 1 3       HEALTH MAINTENANCE:  Cholesterol: (  Cholesterol   Date Value Ref Range Status   2015 117 0 - 200 mg/dL Final     Comment:     LDL Cholesterol is the primary guide to therapy.   The NCEP recommends further evaluation of: patients with cholesterol greater   than 200 mg/dL if additional risk factors are present, cholesterol greater   than   240 mg/dL, triglycerides greater than 150 mg/dL, or HDL less than 40 mg/dL.     2015 177 <200 mg/dL Final     Comment:     LDL Cholesterol is the primary guide to therapy.   The NCEP recommends further evaluation of: patients with cholesterol greater   than 200 mg/dL if additional risk factors are present, cholesterol greater   than   240 mg/dL, triglycerides greater than 150 mg/dL, or HDL less than 40 mg/dL.        Last Mammo: 3/13/19, Result: Normal, Next Mammo: Today   Pap: (No results found for: PAP )  DEXA:  2019  Colonoscopy:  3/3/2015Result:  Normal, Next Colonoscopy:  no further screening recommended.    HISTORY:  OB History    Para Term  AB Living   2 2 2 0 0 2   SAB TAB Ectopic Multiple Live Births   0 0 0 0 2      # Outcome Date GA Lbr Zeeshan/2nd Weight Sex Delivery Anes PTL Lv   2 Term         SHANKAR   1 Term         SHANKAR     Past Medical " History:   Diagnosis Date     CAD (coronary artery disease) 5/2015    Left Heart cath - 90% stenosis mid LAD - PTCA/birfurcation stenting with MICKIE of mid LAD and 2nd diagonal branch placed and 100% occlusion 2nd diagonal branch - PTCA/MICKIE, 5/2015 EF 40-45% by Echo     Carcinoma in situ of skin      Depression      Hormone replacement therapy     she wants to continue HRT even though she is aware that it increases her lifetime risk of breast cancer.     Ischemic cardiomyopathy      Menopausal syndrome      NSTEMI (non-ST elevated myocardial infarction) (H) 5/21/2015     Osteoporosis 2007     Shortness of breath      Past Surgical History:   Procedure Laterality Date     BIOPSY OF SKIN LESION       HEART CATH LEFT HEART CATH  5/21/2015    MICKIE to second diagonal branch, bifurcation stent to Mid LAD. Severe 90% stenosis in mid LAD and 100% occlusion of second diagonal branch     ORTHOPEDIC SURGERY  2006    hip replacement     Family History   Problem Relation Age of Onset     Cancer Mother      Coronary Artery Disease Father 77        MI     Breast Cancer Daughter 53        daughter has stage IV breast cancer at age 53     Social History     Socioeconomic History     Marital status:      Spouse name: Not on file     Number of children: 2     Years of education: Not on file     Highest education level: Not on file   Occupational History     Occupation: Retired   Social Needs     Financial resource strain: Not on file     Food insecurity     Worry: Not on file     Inability: Not on file     Transportation needs     Medical: Not on file     Non-medical: Not on file   Tobacco Use     Smoking status: Never Smoker     Smokeless tobacco: Never Used   Substance and Sexual Activity     Alcohol use: Yes     Alcohol/week: 0.0 standard drinks     Comment: Rare     Drug use: No     Sexual activity: Not Currently     Partners: Male     Birth control/protection: Post-menopausal   Lifestyle     Physical activity     Days per  week: Not on file     Minutes per session: Not on file     Stress: Not on file   Relationships     Social connections     Talks on phone: Not on file     Gets together: Not on file     Attends Anabaptist service: Not on file     Active member of club or organization: Not on file     Attends meetings of clubs or organizations: Not on file     Relationship status: Not on file     Intimate partner violence     Fear of current or ex partner: Not on file     Emotionally abused: Not on file     Physically abused: Not on file     Forced sexual activity: Not on file   Other Topics Concern     Parent/sibling w/ CABG, MI or angioplasty before 65F 55M? No      Service Not Asked     Blood Transfusions Not Asked     Caffeine Concern No     Comment: no caffeine     Occupational Exposure Not Asked     Hobby Hazards Not Asked     Sleep Concern No     Stress Concern No     Weight Concern Not Asked     Special Diet No     Back Care Not Asked     Exercise Yes     Comment: walking dog, 2 story house, folk dancing     Bike Helmet Not Asked     Seat Belt Yes     Self-Exams Not Asked   Social History Narrative     Not on file     Current Outpatient Medications   Medication Sig     ammonium lactate (AMLACTIN) 12 % cream Apply topically daily as needed      Ascorbic Acid (VITAMIN C PO) Take by mouth as needed     aspirin EC 81 MG EC tablet Take 1 tablet (81 mg) by mouth daily (Patient taking differently: Take 81 mg by mouth )     atorvastatin (LIPITOR) 40 MG tablet Take 1 tablet (40 mg) by mouth daily     B COMPLEX VITAMINS PO Take by mouth daily     lisinopril (ZESTRIL) 5 MG tablet Take 1 tablet (5 mg) by mouth daily     metoprolol succinate ER (TOPROL-XL) 25 MG 24 hr tablet Take 0.5 tablets (12.5 mg) by mouth daily     Multiple Vitamin (MULTI-VITAMINS) TABS Take 1 tablet by mouth     VITAMIN E NATURAL PO Take 400 Units by mouth daily     No current facility-administered medications for this visit.      Allergies   Allergen  "Reactions     Codeine Camsylate Other (See Comments)     \"I get loopy\"       Past medical, surgical, social and family history were reviewed and updated in EPIC.    EXAM:  There were no vitals taken for this visit.   BMI: There is no height or weight on file to calculate BMI.    Constitutional: Appearance: Well nourished, well developed alert, in no acute distress  Breasts: Inspection of Breasts:  No lymphadenopathy present    Palpation of Breasts and Axillae:  No masses present on palpation, no  breast tenderness    Axillary Lymph Nodes:  No lymphadenopathy present  Neurologic/Psychiatric:    Mental Status:  Oriented X3     {Pelvic Exam:384936}    There is no height or weight on file to calculate BMI.  {Weight Management Plan -- Delete if patient has a normal BMI:706088}   reports that she has never smoked. She has never used smokeless tobacco.  {Tobacco Cessation -- Delete if patient is a non-smoker:702082}    ASSESSMENT:  85 year old female with satisfactory annual exam.  No diagnosis found.    COUNSELING:   {Female:434910::\"Reviewed preventive health counseling, as reflected in patient instructions\"}    PLAN/PATIENT INSTRUCTIONS:    There are no Patient Instructions on file for this visit.    Kirstie Rivera MD          "

## 2021-04-06 ENCOUNTER — OFFICE VISIT (OUTPATIENT)
Dept: OBGYN | Facility: CLINIC | Age: 86
End: 2021-04-06
Payer: COMMERCIAL

## 2021-04-06 VITALS
SYSTOLIC BLOOD PRESSURE: 108 MMHG | BODY MASS INDEX: 18.4 KG/M2 | HEIGHT: 62 IN | HEART RATE: 78 BPM | WEIGHT: 100 LBS | DIASTOLIC BLOOD PRESSURE: 68 MMHG

## 2021-04-06 DIAGNOSIS — Z01.419 ENCOUNTER FOR GYNECOLOGICAL EXAMINATION WITHOUT ABNORMAL FINDING: Primary | ICD-10-CM

## 2021-04-06 PROCEDURE — 99397 PER PM REEVAL EST PAT 65+ YR: CPT | Performed by: OBSTETRICS & GYNECOLOGY

## 2021-04-06 SDOH — HEALTH STABILITY: MENTAL HEALTH: HOW MANY STANDARD DRINKS CONTAINING ALCOHOL DO YOU HAVE ON A TYPICAL DAY?: 1 OR 2

## 2021-04-06 SDOH — HEALTH STABILITY: MENTAL HEALTH: HOW OFTEN DO YOU HAVE A DRINK CONTAINING ALCOHOL?: MONTHLY OR LESS

## 2021-04-06 SDOH — HEALTH STABILITY: MENTAL HEALTH: HOW OFTEN DO YOU HAVE 6 OR MORE DRINKS ON ONE OCCASION?: NEVER

## 2021-04-06 ASSESSMENT — ANXIETY QUESTIONNAIRES
2. NOT BEING ABLE TO STOP OR CONTROL WORRYING: SEVERAL DAYS
7. FEELING AFRAID AS IF SOMETHING AWFUL MIGHT HAPPEN: SEVERAL DAYS
5. BEING SO RESTLESS THAT IT IS HARD TO SIT STILL: NOT AT ALL
3. WORRYING TOO MUCH ABOUT DIFFERENT THINGS: SEVERAL DAYS
GAD7 TOTAL SCORE: 4
1. FEELING NERVOUS, ANXIOUS, OR ON EDGE: NOT AT ALL
6. BECOMING EASILY ANNOYED OR IRRITABLE: SEVERAL DAYS
IF YOU CHECKED OFF ANY PROBLEMS ON THIS QUESTIONNAIRE, HOW DIFFICULT HAVE THESE PROBLEMS MADE IT FOR YOU TO DO YOUR WORK, TAKE CARE OF THINGS AT HOME, OR GET ALONG WITH OTHER PEOPLE: SOMEWHAT DIFFICULT

## 2021-04-06 ASSESSMENT — PATIENT HEALTH QUESTIONNAIRE - PHQ9
5. POOR APPETITE OR OVEREATING: NOT AT ALL
SUM OF ALL RESPONSES TO PHQ QUESTIONS 1-9: 3

## 2021-04-06 ASSESSMENT — MIFFLIN-ST. JEOR: SCORE: 847.88

## 2021-04-06 NOTE — PROGRESS NOTES
Charlotte is a 85 year old  female who presents for annual exam.     Besides routine health maintenance, she has no other health concerns today .    Do you have a Health Care Directive?: Yes, patient states has an Advance Care Planning document and will bring a copy to the clinic.    Fall risk:   Fallen 2 or more times in the past year?: No  Any fall with injury in the past year?: No    HPI:  The patient's PCP is  Shira Puckett Ann DO  Her  of 48 yrs  this fall  She is very sad and tearful   Has 1 living child who lives an hour away    GYNECOLOGIC HISTORY:  No LMP recorded. Patient is postmenopausal..   reports that she has never smoked. She has never used smokeless tobacco.    Patient is not sexually active.  STD testing offered?  Declined  Last PHQ-9 score on record=   PHQ-9 SCORE 2021   PHQ-9 Total Score 3     Last GAD7 score on record=   SCOOBY-7 SCORE 3/8/2017 3/12/2018 2021   Total Score 1 3 4     Alcohol Score = 1    HEALTH MAINTENANCE:  Cholesterol: (  Cholesterol   Date Value Ref Range Status   2015 117 0 - 200 mg/dL Final     Comment:     LDL Cholesterol is the primary guide to therapy.   The NCEP recommends further evaluation of: patients with cholesterol greater   than 200 mg/dL if additional risk factors are present, cholesterol greater   than   240 mg/dL, triglycerides greater than 150 mg/dL, or HDL less than 40 mg/dL.     2015 177 <200 mg/dL Final     Comment:     LDL Cholesterol is the primary guide to therapy.   The NCEP recommends further evaluation of: patients with cholesterol greater   than 200 mg/dL if additional risk factors are present, cholesterol greater   than   240 mg/dL, triglycerides greater than 150 mg/dL, or HDL less than 40 mg/dL.        Last Mammo: 3/19/21, Result: Normal, Next Mammo:    Pap: (No results found for: PAP )  DEXA:  2019  Colonoscopy:  3/3/2015, Result:  Normal, Next Colonoscopy:no further screening recommended    Health  maintenance updated:  yes    HISTORY:  OB History    Para Term  AB Living   2 2 2 0 0 2   SAB TAB Ectopic Multiple Live Births   0 0 0 0 2      # Outcome Date GA Lbr Zeeshan/2nd Weight Sex Delivery Anes PTL Lv   2 Term         SHANKAR   1 Term         SHANKAR     Patient Active Problem List   Diagnosis     AK (actinic keratosis)     History of skin cancer     Xerosis of skin     Skin exam, screening for cancer     CAD (coronary artery disease)     NSTEMI (non-ST elevated myocardial infarction) (H)     Depression     Ischemic cardiomyopathy     ACS (acute coronary syndrome) (H)     Past Surgical History:   Procedure Laterality Date     BIOPSY OF SKIN LESION       HEART CATH LEFT HEART CATH  2015    MICKIE to second diagonal branch, bifurcation stent to Mid LAD. Severe 90% stenosis in mid LAD and 100% occlusion of second diagonal branch     ORTHOPEDIC SURGERY  2006    hip replacement      Social History     Tobacco Use     Smoking status: Never Smoker     Smokeless tobacco: Never Used   Substance Use Topics     Alcohol use: Yes     Frequency: Monthly or less     Drinks per session: 1 or 2     Binge frequency: Never     Comment: Rare      Problem (# of Occurrences) Relation (Name,Age of Onset)    Breast Cancer (1) Daughter (53): daughter has stage IV breast cancer at age 53    Cancer (1) Mother    Coronary Artery Disease (1) Father (77): MI            Current Outpatient Medications   Medication Sig     ammonium lactate (AMLACTIN) 12 % cream Apply topically daily as needed      Ascorbic Acid (VITAMIN C PO) Take by mouth as needed     aspirin EC 81 MG EC tablet Take 1 tablet (81 mg) by mouth daily (Patient taking differently: Take 81 mg by mouth )     atorvastatin (LIPITOR) 40 MG tablet Take 1 tablet (40 mg) by mouth daily     B COMPLEX VITAMINS PO Take by mouth daily     lisinopril (ZESTRIL) 5 MG tablet Take 1 tablet (5 mg) by mouth daily     metoprolol succinate ER (TOPROL-XL) 25 MG 24 hr tablet Take 0.5 tablets  "(12.5 mg) by mouth daily     Multiple Vitamin (MULTI-VITAMINS) TABS Take 1 tablet by mouth     VITAMIN E NATURAL PO Take 400 Units by mouth daily     No current facility-administered medications for this visit.        Allergies   Allergen Reactions     Codeine Camsylate Other (See Comments)     \"I get loopy\"       Past medical, surgical, social and family history were reviewed and updated in EPIC.    ROS:   12 point review of systems negative other than symptoms noted below or in the HPI.  No urinary frequency or dysuria, bladder or kidney problems    EXAM:  /68   Pulse 78   Ht 1.568 m (5' 1.75\")   Wt 45.4 kg (100 lb)   BMI 18.44 kg/m     BMI: Body mass index is 18.44 kg/m .    EXAM:  Constitutional: Appearance: Well nourished, well developed alert, in no acute distress  Neck:  Lymph Nodes:  No lymphadenopathy present    Thyroid:  Gland size normal, nontender, no nodules or masses present  on palpation  Chest:  Respiratory Effort:  Breathing unlabored  Cardiovascular:Heart    Auscultation:  Regular rate, normal rhythm, no murmurs present  Breasts: Inspection of Breasts:  No lymphadenopathy present., Palpation of Breasts and Axillae:  No masses present on palpation, no breast tenderness., Axillary Lymph Nodes:  No lymphadenopathy present. and No nodularity, asymmetry or nipple discharge bilaterally.  Gastrointestinal:  Abdominal Examination:  Abdomen nontender to palpation, tone normal without     rigidity or guarding, no masses present, umbilicus without lesions    Liver and speen:  No hepatomegaly present, liver nontender to palpation    Hernias:  No hernias present  Lymphatic: Lymph Nodes:  No other lymphadenopathy present  Skin:  General Inspection:  No rashes present, no lesions present, no areas of  discoloration.    Genitalia and Groin:  No rashes present, no lesions present, no areas of  discoloration, no masses present  Neurologic/Psychiatric:    Mental Status:  Oriented X3     Pelvic " Exam:  External Genitalia:     Normal appearance for age, no discharge present, no tenderness present, no inflammatory lesions present, color normal  Vagina:     Normal vaginal vault without central or paravaginal defects, ATROPHIC  Bladder:     Nontender to palpation  Urethra:   Urethral Body:  Urethra palpation normal, urethra structural support normal   Urethral Meatus:  No erythema or lesions present  Cervix:     Appearance healthy, no lesions present, nontender to palpation, no bleeding present  Uterus:     Nontender to palpation, no masses present, position anteflexed, mobility: normal  Adnexa:     No adnexal tenderness present, no adnexal masses present  Perineum:     Perineum within normal limits, no evidence of trauma, no rashes or skin lesions present  Inguinal Lymph Nodes:     No lymphadenopathy present          BMI:  Body mass index is 18.44 kg/m .  Weight management plan noted, stable and monitoring   reports that she has never smoked. She has never used smokeless tobacco.      ASSESSMENT:  85 year old female with satisfactory annual exam.    ICD-10-CM    1. Encounter for gynecological examination without abnormal finding  Z01.419        PLAN:  Discussed grief and coping  Discussed safety at home  rec she consider having covid vaccine  Annual mammogram    Kirstie Rivera MD

## 2021-04-07 ASSESSMENT — ANXIETY QUESTIONNAIRES: GAD7 TOTAL SCORE: 4

## 2021-06-07 DIAGNOSIS — I21.4 NSTEMI (NON-ST ELEVATED MYOCARDIAL INFARCTION) (H): ICD-10-CM

## 2021-06-07 RX ORDER — METOPROLOL SUCCINATE 25 MG/1
12.5 TABLET, EXTENDED RELEASE ORAL DAILY
Qty: 45 TABLET | Refills: 1 | Status: SHIPPED | OUTPATIENT
Start: 2021-06-07 | End: 2022-04-01

## 2021-07-05 DIAGNOSIS — I24.9 ACS (ACUTE CORONARY SYNDROME) (H): ICD-10-CM

## 2021-07-05 RX ORDER — ATORVASTATIN CALCIUM 40 MG/1
40 TABLET, FILM COATED ORAL DAILY
Qty: 90 TABLET | Refills: 1 | Status: SHIPPED | OUTPATIENT
Start: 2021-07-05 | End: 2022-04-01

## 2021-11-04 DIAGNOSIS — I21.4 NSTEMI (NON-ST ELEVATED MYOCARDIAL INFARCTION) (H): ICD-10-CM

## 2021-11-04 RX ORDER — LISINOPRIL 5 MG/1
5 TABLET ORAL DAILY
Qty: 90 TABLET | Refills: 0 | Status: SHIPPED | OUTPATIENT
Start: 2021-11-04 | End: 2022-04-01

## 2022-02-02 ENCOUNTER — OFFICE VISIT (OUTPATIENT)
Dept: CARDIOLOGY | Facility: CLINIC | Age: 87
End: 2022-02-02
Attending: INTERNAL MEDICINE
Payer: COMMERCIAL

## 2022-02-02 VITALS
HEART RATE: 57 BPM | SYSTOLIC BLOOD PRESSURE: 139 MMHG | BODY MASS INDEX: 18.64 KG/M2 | WEIGHT: 101.3 LBS | DIASTOLIC BLOOD PRESSURE: 74 MMHG | HEIGHT: 62 IN | OXYGEN SATURATION: 98 %

## 2022-02-02 DIAGNOSIS — I24.9 ACS (ACUTE CORONARY SYNDROME) (H): ICD-10-CM

## 2022-02-02 PROCEDURE — 99214 OFFICE O/P EST MOD 30 MIN: CPT | Performed by: INTERNAL MEDICINE

## 2022-02-02 ASSESSMENT — MIFFLIN-ST. JEOR: SCORE: 848.77

## 2022-02-02 NOTE — PROGRESS NOTES
HPI and Plan:     It is a pleasure to follow up with Mrs. Bradshaw who has coronary artery disease and a history of mild left ventricular systolic dysfunction from ischemic cardiomyopathy.  The latter condition has resolved.    This is a lady with 2 episodes of acute coronary syndrome.  First one was in 2015.  There was mild left ventricular systolic dysfunction initially.    Coronary angiography revealed 90% stenosis in the mid LAD with 100% stenosis of the second diagonal.  These lesions were treated with bifurcation stenting into both lesions with an excellent angiographic result. In 11/2016 she had recurrent chest discomfort.  Peak troponin was 12.  Now she has a new lesion in the right PDA and this was angioplastied and stented.  On both occasions she had completed a full year of dual antiplatelet therapy.       Her devoted  of more than 40 years, who has accompanied her on every clinic visit on previous occasions, passed away in September 2020 after a fall.    She still feeling depressed about this.  I am sure this would account for her weight loss of about 15 pounds in 2 years.  She has lost her appetite.  She has no urinary or bowel disturbances.  I am also happy to hear that she has no cardiac symptoms either and all medications are well-tolerated     IMPRESSION:   1.  Stable coronary artery disease.   2.  Ischemic cardiomyopathy, now resolved.   3.  Adequate blood pressure for age.    4.  Dyslipidemia, on moderate dose atorvastatin.  Excellent numbers recently with both HDL and LDL in the 50s.    From the cardiac point of view she is stable.  I will see her again in 1 years time for continued follow-up  Orders Placed This Encounter   Procedures     Follow-Up with Cardiology       No orders of the defined types were placed in this encounter.      Encounter Diagnosis   Name Primary?     ACS (acute coronary syndrome) (H)        CURRENT MEDICATIONS:  Current Outpatient Medications   Medication Sig Dispense  "Refill     ammonium lactate (AMLACTIN) 12 % cream Apply topically daily as needed        Ascorbic Acid (VITAMIN C PO) Take by mouth as needed       aspirin EC 81 MG EC tablet Take 1 tablet (81 mg) by mouth daily (Patient taking differently: Take 81 mg by mouth ) 30 tablet 0     atorvastatin (LIPITOR) 40 MG tablet Take 1 tablet (40 mg) by mouth daily 90 tablet 1     B COMPLEX VITAMINS PO Take by mouth daily       lisinopril (ZESTRIL) 5 MG tablet Take 1 tablet (5 mg) by mouth daily 90 tablet 0     metoprolol succinate ER (TOPROL-XL) 25 MG 24 hr tablet Take 0.5 tablets (12.5 mg) by mouth daily 45 tablet 1     Multiple Vitamin (MULTI-VITAMINS) TABS Take 1 tablet by mouth       VITAMIN E NATURAL PO Take 400 Units by mouth daily         ALLERGIES     Allergies   Allergen Reactions     Codeine Camsylate Other (See Comments)     \"I get loopy\"       PAST MEDICAL HISTORY:  Past Medical History:   Diagnosis Date     CAD (coronary artery disease) 5/2015    Left Heart cath - 90% stenosis mid LAD - PTCA/birfurcation stenting with MICKIE of mid LAD and 2nd diagonal branch placed and 100% occlusion 2nd diagonal branch - PTCA/MICKIE, 5/2015 EF 40-45% by Echo     Carcinoma in situ of skin      Depression      Hormone replacement therapy     she wants to continue HRT even though she is aware that it increases her lifetime risk of breast cancer.     Ischemic cardiomyopathy      Menopausal syndrome      NSTEMI (non-ST elevated myocardial infarction) (H) 5/21/2015     Osteoporosis 2007     Shortness of breath        PAST SURGICAL HISTORY:  Past Surgical History:   Procedure Laterality Date     BIOPSY OF SKIN LESION       HEART CATH LEFT HEART CATH  5/21/2015    MICKIE to second diagonal branch, bifurcation stent to Mid LAD. Severe 90% stenosis in mid LAD and 100% occlusion of second diagonal branch     ORTHOPEDIC SURGERY  2006    hip replacement       FAMILY HISTORY:  Family History   Problem Relation Age of Onset     Cancer Mother      Coronary " Artery Disease Father 77        MI     Breast Cancer Daughter 53        daughter has stage IV breast cancer at age 53       SOCIAL HISTORY:  Social History     Socioeconomic History     Marital status:      Spouse name: None     Number of children: 2     Years of education: None     Highest education level: None   Occupational History     Occupation: Retired   Tobacco Use     Smoking status: Never Smoker     Smokeless tobacco: Never Used   Substance and Sexual Activity     Alcohol use: Yes     Comment: Rare     Drug use: No     Sexual activity: Not Currently     Partners: Male     Birth control/protection: Post-menopausal   Other Topics Concern     Parent/sibling w/ CABG, MI or angioplasty before 65F 55M? No      Service Not Asked     Blood Transfusions Not Asked     Caffeine Concern No     Comment: no caffeine     Occupational Exposure Not Asked     Hobby Hazards Not Asked     Sleep Concern No     Stress Concern No     Weight Concern Not Asked     Special Diet No     Back Care Not Asked     Exercise Yes     Comment: walking dog, 2 story house, folk dancing     Bike Helmet Not Asked     Seat Belt Yes     Self-Exams Not Asked   Social History Narrative     None     Social Determinants of Health     Financial Resource Strain: Not on file   Food Insecurity: Not on file   Transportation Needs: Not on file   Physical Activity: Not on file   Stress: Not on file   Social Connections: Not on file   Intimate Partner Violence: Not on file   Housing Stability: Not on file       Review of Systems:  Skin:  Negative     Eyes:  Positive for glasses  ENT:  Positive for tinnitus  Respiratory:  Negative shortness of breath;dyspnea on exertion;cough  Cardiovascular:  chest pain;palpitations;Negative;dizziness;lightheadedness Positive for;edema  Gastroenterology: Negative    Genitourinary:  Negative    Musculoskeletal:  Negative neck pain;back pain  Neurologic:  Negative headaches  Psychiatric:  Negative   "  Heme/Lymph/Imm:  Positive for allergies  Endocrine:  Negative      Physical Exam:  Vitals: /74 (BP Location: Right arm, Patient Position: Sitting)   Pulse 57   Ht 1.568 m (5' 1.75\")   Wt 45.9 kg (101 lb 4.8 oz)   SpO2 98%   BMI 18.68 kg/m      Constitutional:  cooperative, alert and oriented, well developed, well nourished, in no acute distress        Skin:  warm and dry to the touch, no apparent skin lesions or masses noted          Head:  normocephalic, no masses or lesions        Eyes:  pupils equal and round, conjunctivae and lids unremarkable, sclera white, no xanthalasma, EOMS intact, no nystagmus        Lymph:No Cervical lymphadenopathy present     ENT:  no pallor or cyanosis, dentition good        Neck:  carotid pulses are full and equal bilaterally, JVP normal, no carotid bruit        Respiratory:  normal breath sounds, clear to auscultation, normal A-P diameter, normal symmetry, normal respiratory excursion, no use of accessory muscles         Cardiac: regular rhythm, normal S1/S2, no S3 or S4, apical impulse not displaced, no murmurs, gallops or rubs                pulses full and equal, no bruits auscultated                                        GI:  abdomen soft, non-tender, BS normoactive, no mass, no HSM, no bruits        Extremities and Muscular Skeletal:  no deformities, clubbing, cyanosis, erythema observed         L radial site has firm vashti sized bump. Strong radial pulse, good ROM of wrist and fingers, good capillary refill. Proximal to radial insertion site her arm is diffusley echymotic, especially under/below her elbow    Neurological:  no gross motor deficits        Psych:  Alert and Oriented x 3        Recent Lab Results:  LIPID RESULTS:  Lab Results   Component Value Date    CHOL 117 08/05/2015    HDL 63 08/05/2015    LDL 44 (L) 08/05/2015    TRIG 52 08/05/2015    CHOLHDLRATIO 1.9 08/05/2015       LIVER ENZYME RESULTS:  Lab Results   Component Value Date    AST 24 " 11/21/2016    ALT 31 11/21/2016       CBC RESULTS:  Lab Results   Component Value Date    WBC 6.6 11/23/2016    RBC 3.80 11/23/2016    HGB 12.2 11/23/2016    HCT 36.0 11/23/2016    MCV 95 11/23/2016    MCH 32.1 11/23/2016    MCHC 33.9 11/23/2016    RDW 12.3 11/23/2016     11/23/2016       BMP RESULTS:  Lab Results   Component Value Date     12/05/2016    POTASSIUM 4.0 12/05/2016    CHLORIDE 104 12/05/2016    CO2 32 (H) 12/05/2016    ANIONGAP 9 12/05/2016    GLC 98 12/05/2016    BUN 16 12/05/2016    CR 0.92 12/05/2016    GFRESTIMATED 59 (L) 12/05/2016    GFRESTBLACK 71 12/05/2016    COLTEN 9.5 12/05/2016        A1C RESULTS:  Lab Results   Component Value Date    A1C 5.7 05/21/2015       INR RESULTS:  Lab Results   Component Value Date    INR 1.00 05/21/2015    INR 1.67 (H) 01/06/2006           CC  Abilio Razo MD  [id number] [place] [address]  [place] [id number]

## 2022-02-02 NOTE — LETTER
2/2/2022    Shira Puckett DO  [facility] [address] [facility] [id number]    RE: Charlotte Bradshaw       Dear Colleague,     I had the pleasure of seeing Charlotte Bradshaw in the [facility].  HPI and Plan:     It is a pleasure to follow up with Mrs. Bradshaw who has coronary artery disease and a history of mild left ventricular systolic dysfunction from ischemic cardiomyopathy.  The latter condition has resolved.    This is a lady with 2 episodes of acute coronary syndrome.  First one was in 2015.  There was mild left ventricular systolic dysfunction initially.    Coronary angiography revealed 90% stenosis in the mid LAD with 100% stenosis of the second diagonal.  These lesions were treated with bifurcation stenting into both lesions with an excellent angiographic result. In 11/2016 she had recurrent chest discomfort.  Peak troponin was 12.  Now she has a new lesion in the right PDA and this was angioplastied and stented.  On both occasions she had completed a full year of dual antiplatelet therapy.       Her devoted  of more than 40 years, who has accompanied her on every clinic visit on previous occasions, passed away in September 2020 after a fall.    She still feeling depressed about this.  I am sure this would account for her weight loss of about 15 pounds in 2 years.  She has lost her appetite.  She has no urinary or bowel disturbances.  I am also happy to hear that she has no cardiac symptoms either and all medications are well-tolerated     IMPRESSION:   1.  Stable coronary artery disease.   2.  Ischemic cardiomyopathy, now resolved.   3.  Adequate blood pressure for age.    4.  Dyslipidemia, on moderate dose atorvastatin.  Excellent numbers recently with both HDL and LDL in the 50s.    From the cardiac point of view she is stable.  I will see her again in 1 years time for continued follow-up  Orders Placed This Encounter   Procedures     Follow-Up with Cardiology  "      No orders of the defined types were placed in this encounter.      Encounter Diagnosis   Name Primary?     ACS (acute coronary syndrome) (H)        CURRENT MEDICATIONS:  Current Outpatient Medications   Medication Sig Dispense Refill     ammonium lactate (AMLACTIN) 12 % cream Apply topically daily as needed        Ascorbic Acid (VITAMIN C PO) Take by mouth as needed       aspirin EC 81 MG EC tablet Take 1 tablet (81 mg) by mouth daily (Patient taking differently: Take 81 mg by mouth ) 30 tablet 0     atorvastatin (LIPITOR) 40 MG tablet Take 1 tablet (40 mg) by mouth daily 90 tablet 1     B COMPLEX VITAMINS PO Take by mouth daily       lisinopril (ZESTRIL) 5 MG tablet Take 1 tablet (5 mg) by mouth daily 90 tablet 0     metoprolol succinate ER (TOPROL-XL) 25 MG 24 hr tablet Take 0.5 tablets (12.5 mg) by mouth daily 45 tablet 1     Multiple Vitamin (MULTI-VITAMINS) TABS Take 1 tablet by mouth       VITAMIN E NATURAL PO Take 400 Units by mouth daily         ALLERGIES     Allergies   Allergen Reactions     Codeine Camsylate Other (See Comments)     \"I get loopy\"       PAST MEDICAL HISTORY:  Past Medical History:   Diagnosis Date     CAD (coronary artery disease) 5/2015    Left Heart cath - 90% stenosis mid LAD - PTCA/birfurcation stenting with MICKIE of mid LAD and 2nd diagonal branch placed and 100% occlusion 2nd diagonal branch - PTCA/MICKIE, 5/2015 EF 40-45% by Echo     Carcinoma in situ of skin      Depression      Hormone replacement therapy     she wants to continue HRT even though she is aware that it increases her lifetime risk of breast cancer.     Ischemic cardiomyopathy      Menopausal syndrome      NSTEMI (non-ST elevated myocardial infarction) (H) 5/21/2015     Osteoporosis 2007     Shortness of breath        PAST SURGICAL HISTORY:  Past Surgical History:   Procedure Laterality Date     BIOPSY OF SKIN LESION       HEART CATH LEFT HEART CATH  5/21/2015    MICKIE to second diagonal branch, bifurcation stent to " Mid LAD. Severe 90% stenosis in mid LAD and 100% occlusion of second diagonal branch     ORTHOPEDIC SURGERY  2006    hip replacement       FAMILY HISTORY:  Family History   Problem Relation Age of Onset     Cancer Mother      Coronary Artery Disease Father 77        MI     Breast Cancer Daughter 53        daughter has stage IV breast cancer at age 53       SOCIAL HISTORY:  Social History     Socioeconomic History     Marital status:      Spouse name: None     Number of children: 2     Years of education: None     Highest education level: None   Occupational History     Occupation: Retired   Tobacco Use     Smoking status: Never Smoker     Smokeless tobacco: Never Used   Substance and Sexual Activity     Alcohol use: Yes     Comment: Rare     Drug use: No     Sexual activity: Not Currently     Partners: Male     Birth control/protection: Post-menopausal   Other Topics Concern     Parent/sibling w/ CABG, MI or angioplasty before 65F 55M? No      Service Not Asked     Blood Transfusions Not Asked     Caffeine Concern No     Comment: no caffeine     Occupational Exposure Not Asked     Hobby Hazards Not Asked     Sleep Concern No     Stress Concern No     Weight Concern Not Asked     Special Diet No     Back Care Not Asked     Exercise Yes     Comment: walking dog, 2 story house, folk dancing     Bike Helmet Not Asked     Seat Belt Yes     Self-Exams Not Asked   Social History Narrative     None     Social Determinants of Health     Financial Resource Strain: Not on file   Food Insecurity: Not on file   Transportation Needs: Not on file   Physical Activity: Not on file   Stress: Not on file   Social Connections: Not on file   Intimate Partner Violence: Not on file   Housing Stability: Not on file       Review of Systems:  Skin:  Negative     Eyes:  Positive for glasses  ENT:  Positive for tinnitus  Respiratory:  Negative shortness of breath;dyspnea on exertion;cough  Cardiovascular:  chest  "pain;palpitations;Negative;dizziness;lightheadedness Positive for;edema  Gastroenterology: Negative    Genitourinary:  Negative    Musculoskeletal:  Negative neck pain;back pain  Neurologic:  Negative headaches  Psychiatric:  Negative    Heme/Lymph/Imm:  Positive for allergies  Endocrine:  Negative      Physical Exam:  Vitals: /74 (BP Location: Right arm, Patient Position: Sitting)   Pulse 57   Ht 1.568 m (5' 1.75\")   Wt 45.9 kg (101 lb 4.8 oz)   SpO2 98%   BMI 18.68 kg/m      Constitutional:  cooperative, alert and oriented, well developed, well nourished, in no acute distress        Skin:  warm and dry to the touch, no apparent skin lesions or masses noted          Head:  normocephalic, no masses or lesions        Eyes:  pupils equal and round, conjunctivae and lids unremarkable, sclera white, no xanthalasma, EOMS intact, no nystagmus        Lymph:No Cervical lymphadenopathy present     ENT:  no pallor or cyanosis, dentition good        Neck:  carotid pulses are full and equal bilaterally, JVP normal, no carotid bruit        Respiratory:  normal breath sounds, clear to auscultation, normal A-P diameter, normal symmetry, normal respiratory excursion, no use of accessory muscles         Cardiac: regular rhythm, normal S1/S2, no S3 or S4, apical impulse not displaced, no murmurs, gallops or rubs                pulses full and equal, no bruits auscultated                                        GI:  abdomen soft, non-tender, BS normoactive, no mass, no HSM, no bruits        Extremities and Muscular Skeletal:  no deformities, clubbing, cyanosis, erythema observed         L radial site has firm vashti sized bump. Strong radial pulse, good ROM of wrist and fingers, good capillary refill. Proximal to radial insertion site her arm is diffusley echymotic, especially under/below her elbow    Neurological:  no gross motor deficits        Psych:  Alert and Oriented x 3        Recent Lab Results:  LIPID RESULTS:  Lab " Results   Component Value Date    CHOL 117 08/05/2015    HDL 63 08/05/2015    LDL 44 (L) 08/05/2015    TRIG 52 08/05/2015    CHOLHDLRATIO 1.9 08/05/2015       LIVER ENZYME RESULTS:  Lab Results   Component Value Date    AST 24 11/21/2016    ALT 31 11/21/2016       CBC RESULTS:  Lab Results   Component Value Date    WBC 6.6 11/23/2016    RBC 3.80 11/23/2016    HGB 12.2 11/23/2016    HCT 36.0 11/23/2016    MCV 95 11/23/2016    MCH 32.1 11/23/2016    MCHC 33.9 11/23/2016    RDW 12.3 11/23/2016     11/23/2016       BMP RESULTS:  Lab Results   Component Value Date     12/05/2016    POTASSIUM 4.0 12/05/2016    CHLORIDE 104 12/05/2016    CO2 32 (H) 12/05/2016    ANIONGAP 9 12/05/2016    GLC 98 12/05/2016    BUN 16 12/05/2016    CR 0.92 12/05/2016    GFRESTIMATED 59 (L) 12/05/2016    GFRESTBLACK 71 12/05/2016    COLTEN 9.5 12/05/2016        A1C RESULTS:  Lab Results   Component Value Date    A1C 5.7 05/21/2015       INR RESULTS:  Lab Results   Component Value Date    INR 1.00 05/21/2015    INR 1.67 (H) 01/06/2006           DR AVA MORALES MD   [facility]

## 2022-04-01 DIAGNOSIS — I21.4 NSTEMI (NON-ST ELEVATED MYOCARDIAL INFARCTION) (H): ICD-10-CM

## 2022-04-01 DIAGNOSIS — I24.9 ACS (ACUTE CORONARY SYNDROME) (H): ICD-10-CM

## 2022-04-01 RX ORDER — LISINOPRIL 5 MG/1
5 TABLET ORAL DAILY
Qty: 90 TABLET | Refills: 2 | Status: SHIPPED | OUTPATIENT
Start: 2022-04-01 | End: 2023-02-08

## 2022-04-01 RX ORDER — METOPROLOL SUCCINATE 25 MG/1
12.5 TABLET, EXTENDED RELEASE ORAL DAILY
Qty: 45 TABLET | Refills: 2 | Status: SHIPPED | OUTPATIENT
Start: 2022-04-01 | End: 2023-02-08

## 2022-04-01 RX ORDER — ATORVASTATIN CALCIUM 40 MG/1
40 TABLET, FILM COATED ORAL DAILY
Qty: 90 TABLET | Refills: 2 | Status: SHIPPED | OUTPATIENT
Start: 2022-04-01 | End: 2023-02-08

## 2023-01-19 DIAGNOSIS — I25.10 CAD (CORONARY ARTERY DISEASE): Primary | ICD-10-CM

## 2023-01-19 PROBLEM — I10 HTN (HYPERTENSION): Status: ACTIVE | Noted: 2023-01-19

## 2023-01-19 PROBLEM — E78.5 HYPERLIPIDEMIA LDL GOAL <70: Status: ACTIVE | Noted: 2023-01-19

## 2023-02-08 ENCOUNTER — OFFICE VISIT (OUTPATIENT)
Dept: CARDIOLOGY | Facility: CLINIC | Age: 88
End: 2023-02-08
Payer: COMMERCIAL

## 2023-02-08 VITALS
HEIGHT: 61 IN | HEART RATE: 57 BPM | OXYGEN SATURATION: 99 % | WEIGHT: 97 LBS | SYSTOLIC BLOOD PRESSURE: 170 MMHG | BODY MASS INDEX: 18.31 KG/M2 | DIASTOLIC BLOOD PRESSURE: 90 MMHG

## 2023-02-08 DIAGNOSIS — E78.5 HYPERLIPIDEMIA LDL GOAL <100: ICD-10-CM

## 2023-02-08 DIAGNOSIS — I25.10 CORONARY ARTERY DISEASE INVOLVING NATIVE CORONARY ARTERY OF NATIVE HEART WITHOUT ANGINA PECTORIS: Primary | ICD-10-CM

## 2023-02-08 DIAGNOSIS — I10 BENIGN ESSENTIAL HYPERTENSION: ICD-10-CM

## 2023-02-08 PROCEDURE — 99215 OFFICE O/P EST HI 40 MIN: CPT | Performed by: INTERNAL MEDICINE

## 2023-02-08 PROCEDURE — 93000 ELECTROCARDIOGRAM COMPLETE: CPT | Performed by: INTERNAL MEDICINE

## 2023-02-08 RX ORDER — LISINOPRIL 5 MG/1
5 TABLET ORAL 2 TIMES DAILY
Qty: 200 TABLET | Refills: 4 | Status: SHIPPED | OUTPATIENT
Start: 2023-02-08 | End: 2024-04-05

## 2023-02-08 RX ORDER — ATORVASTATIN CALCIUM 40 MG/1
40 TABLET, FILM COATED ORAL DAILY
Qty: 100 TABLET | Refills: 4 | Status: SHIPPED | OUTPATIENT
Start: 2023-02-08 | End: 2024-04-05

## 2023-02-08 RX ORDER — METOPROLOL SUCCINATE 25 MG/1
25 TABLET, EXTENDED RELEASE ORAL EVERY MORNING
Qty: 100 TABLET | Refills: 5 | Status: SHIPPED | OUTPATIENT
Start: 2023-02-08 | End: 2024-04-05

## 2023-02-08 NOTE — PROGRESS NOTES
Service Date: 02/08/2023    PRIMARY CARE PROVIDER:  Shira Puckett.    REASON FOR VISIT:  1.  Establish care for his CAD, hyperlipidemia.  2.  Uncontrolled hypertension.    HISTORY OF PRESENT ILLNESS:  Charlotte Bradshaw is new to my practice.  She is here to establish care with a female provider.  Previously seen by my partner, Dr. Abilio Razo.    Charlotte was unaccompanied today, she is 87 years old, extremely frail with a low BMI of 18 kg/m2.  She has failed to thrive since the death of her  3 years ago.  Even today, she was tearful multiple times when mentioning personal circumstances and her .  She lives alone, her son lives 45 minutes away whom she sees infrequently, nontobacco user, does her own cooking, cleaning and grocery shopping.    Her cardiovascular history is significant for 2 episodes of non-STEMI, first in 2015 and subsequently in 2016, and she has been treated with bifurcation stenting of the LAD and diagonal arteries and right coronary artery, last intervention in 2016.  She has a history of ischemic cardiomyopathy with subsequent normalization of LVEF on her last echocardiogram in 2016.  LVEF was 55%-60%, grade II diastolic function, normal right ventricular systolic function, no significant valve disease.    Other comorbidities are hypertension and hyperlipidemia.    The following issues were addressed today:  1.  Coronary artery disease, status post LAD/diagonal and RCA stenting.  She is angina-free.  I personally reviewed her ECG today, which shows sinus bradycardia (she is on metoprolol) with no evidence of previous infarct. Echocardiogram from 2016 reviewed as documented above.  She shops in [place] and is able to walk a whole isle without getting any angina, dyspnea or lower extremity edema.    2.  Uncontrolled hypertension.  Her blood pressure is markedly elevated at 174/90 today with a pulse of 60 BPM.  She denies headache, shortness of breath or chest pain.  Currently, takes  low-dose lisinopril 2.5 mg daily and metoprolol XL 12.5 mg daily.  No history of stroke.  Echocardiogram 2016 showed grade II diastolic dysfunction.  Eats soups that were gifted to her by friends and no doubt high in sodium.    3.  Hyperlipidemia with goal LDL less than 70.  She is on atorvastatin 40 mg daily.  Her last labs were in 2016.    PHYSICAL EXAMINATION:    CARDIOVASCULAR:  Regular heart sounds, no audible murmur.  Normal jugular venous pressure.  Undisplaced apical impulse.  RESPIRATORY:  Normal breath sounds.  No rales or wheeze.  EXTREMITIES:  No edema.    DIAGNOSES/ASSESSMENT:  1.  Uncontrolled hypertension.  She is on very small doses of medications.  Will optimize.  2.  Coronary artery disease without angina.  Continue aspirin and optimize beta blocker dosage.  3.  Hyperlipidemia.  No labs since 2016.  Will get updated labs and testing.  4.  Prolonged grief reaction with bereavement.  Seen by her primary provider 6 months ago and started on Lexapro, but it does not seem like she is taking that.  She is still very tearful when talking about the death of her  at age 89 years 3 years ago.  She feels socially isolated and reports stress.  However, she is well kempt and taking care of herself.  Denies feeling depressed or wanting to harm herself. Counseled her about this.    PLAN:    1.  Medication changes:  -- Take lisinopril 5 mg 2 times daily.  -- Take metoprolol XL 25 mg daily (instead of 12.5).  -- Continue atorvastatin and aspirin.    2.  I have ordered fasting labs, echocardiogram and followup with me.    Total time today 45 minutes.  Extensive counseling as the patient was tearful multiple times during our visit.      Manav Starks MD        D: 2023   T: 2023   MT: [email or web address]    Name:     MAKENNA FOUNTAIN  MRN:      [id number]        Account:      [id number]   :      1935           Service Date: 2023       Document: [id number]

## 2023-02-08 NOTE — PATIENT INSTRUCTIONS
1.  Medication changes made today:  -- Take lisinopril 5 mg 2 times daily.  -- Take a full pill of metoprolol XL 25 mg (instead of half a pill).  -- Continue the atorvastatin and aspirin.    2.  Tests ordered today:  -- In 1 month, fasting blood test, heart ultrasound (echocardiogram) and follow-up with Dr. Starks.    If you have any questions or concerns, please contact my nurses at [phone].

## 2023-02-08 NOTE — PROGRESS NOTES
"  Clinic visit note dictated. Dictation reference number - [id number]      PHYSICAL EXAMINATION:  Vitals: BP (!) 170/90   Pulse 57   Ht 1.549 m (5' 1\")   Wt 44 kg (97 lb)   SpO2 99%   BMI 18.33 kg/m    Wt Readings from Last 5 Encounters:   02/08/23 44 kg (97 lb)   02/02/22 45.9 kg (101 lb 4.8 oz)   04/06/21 45.4 kg (100 lb)   02/02/21 51.7 kg (114 lb)   01/29/20 52.3 kg (115 lb 6.4 oz)         Encounter Diagnoses   Name Primary?     Coronary artery disease involving native coronary artery of native heart without angina pectoris Yes     Benign essential hypertension      Hyperlipidemia LDL goal <100          Orders Placed This Encounter   Procedures     Basic metabolic panel     CBC with platelets     TSH with free T4 reflex     Lipid Profile     Follow-Up with Cardiology     Echocardiogram Complete           CURRENT MEDICATIONS:  Current Outpatient Medications   Medication Sig Dispense Refill     ammonium lactate (AMLACTIN) 12 % cream Apply topically daily as needed        Ascorbic Acid (VITAMIN C PO) Take by mouth as needed       aspirin EC 81 MG EC tablet Take 1 tablet (81 mg) by mouth daily (Patient taking differently: Take 81 mg by mouth) 30 tablet 0     atorvastatin (LIPITOR) 40 MG tablet Take 1 tablet (40 mg) by mouth daily 100 tablet 4     B COMPLEX VITAMINS PO Take by mouth daily       lisinopril (ZESTRIL) 5 MG tablet Take 1 tablet (5 mg) by mouth 2 times daily 200 tablet 4     metoprolol succinate ER (TOPROL XL) 25 MG 24 hr tablet Take 1 tablet (25 mg) by mouth every morning 100 tablet 5     Multiple Vitamin (MULTI-VITAMINS) TABS Take 1 tablet by mouth       VITAMIN E NATURAL PO Take 400 Units by mouth daily           ALLERGIES:  Allergies   Allergen Reactions     Codeine Camsylate Other (See Comments)     \"I get loopy\"       PAST MEDICAL HISTORY:    Past Medical History:   Diagnosis Date     CAD (coronary artery disease) 05/01/2015    Left Heart cath - 90% stenosis mid LAD - PTCA/birfurcation stenting " with MICKIE of mid LAD and 2nd diagonal branch placed and 100% occlusion 2nd diagonal branch - PTCA/MICKIE, 5/2015 EF 40-45% by Echo     Carcinoma in situ of skin      Depression      Hormone replacement therapy     she wants to continue HRT even though she is aware that it increases her lifetime risk of breast cancer.     HTN (hypertension)      Hyperlipidemia LDL goal <70      Ischemic cardiomyopathy      Menopausal syndrome      NSTEMI (non-ST elevated myocardial infarction) (H) 05/21/2015     Osteoporosis 01/01/2007     Shortness of breath        PAST SURGICAL HISTORY:    Past Surgical History:   Procedure Laterality Date     BIOPSY OF SKIN LESION       HEART CATH LEFT HEART CATH  5/21/2015    MICKIE to second diagonal branch, bifurcation stent to Mid LAD. Severe 90% stenosis in mid LAD and 100% occlusion of second diagonal branch     ORTHOPEDIC SURGERY  2006    hip replacement       FAMILY HISTORY:    Family History   Problem Relation Age of Onset     Cancer Mother      Coronary Artery Disease Father 77        MI     Breast Cancer Daughter 53        daughter has stage IV breast cancer at age 53       SOCIAL HISTORY:    Social History     Socioeconomic History     Marital status:      Spouse name: None     Number of children: 2     Years of education: None     Highest education level: None   Occupational History     Occupation: Retired   Tobacco Use     Smoking status: Never     Smokeless tobacco: Never   Substance and Sexual Activity     Alcohol use: Yes     Comment: Rare     Drug use: No     Sexual activity: Not Currently     Partners: Male     Birth control/protection: Post-menopausal   Other Topics Concern     Parent/sibling w/ CABG, MI or angioplasty before 65F 55M? No     Caffeine Concern No     Comment: no caffeine     Sleep Concern No     Stress Concern No     Special Diet No     Exercise Yes     Comment: walking dog, 2 story house, folk dancing     Seat Belt Yes

## 2023-02-08 NOTE — LETTER
"2/8/2023    Shira Puckett,   [facility] [address] [facility] [place] [address]    RE: Charlottejayme Bradshaw       Dear Colleague,     I had the pleasure of seeing Charlotte YANELY Bradshaw in the [facility].    Clinic visit note dictated. Dictation reference number - [id number]      PHYSICAL EXAMINATION:  Vitals: BP (!) 170/90   Pulse 57   Ht 1.549 m (5' 1\")   Wt 44 kg (97 lb)   SpO2 99%   BMI 18.33 kg/m    Wt Readings from Last 5 Encounters:   02/08/23 44 kg (97 lb)   02/02/22 45.9 kg (101 lb 4.8 oz)   04/06/21 45.4 kg (100 lb)   02/02/21 51.7 kg (114 lb)   01/29/20 52.3 kg (115 lb 6.4 oz)         Encounter Diagnoses   Name Primary?     Coronary artery disease involving native coronary artery of native heart without angina pectoris Yes     Benign essential hypertension      Hyperlipidemia LDL goal <100          Orders Placed This Encounter   Procedures     Basic metabolic panel     CBC with platelets     TSH with free T4 reflex     Lipid Profile     Follow-Up with Cardiology     Echocardiogram Complete           CURRENT MEDICATIONS:  Current Outpatient Medications   Medication Sig Dispense Refill     ammonium lactate (AMLACTIN) 12 % cream Apply topically daily as needed        Ascorbic Acid (VITAMIN C PO) Take by mouth as needed       aspirin EC 81 MG EC tablet Take 1 tablet (81 mg) by mouth daily (Patient taking differently: Take 81 mg by mouth) 30 tablet 0     atorvastatin (LIPITOR) 40 MG tablet Take 1 tablet (40 mg) by mouth daily 100 tablet 4     B COMPLEX VITAMINS PO Take by mouth daily       lisinopril (ZESTRIL) 5 MG tablet Take 1 tablet (5 mg) by mouth 2 times daily 200 tablet 4     metoprolol succinate ER (TOPROL XL) 25 MG 24 hr tablet Take 1 tablet (25 mg) by mouth every morning 100 tablet 5     Multiple Vitamin (MULTI-VITAMINS) TABS Take 1 tablet by mouth       VITAMIN E NATURAL PO Take 400 Units by mouth daily           ALLERGIES:  Allergies   Allergen Reactions     Codeine Camsylate " "Other (See Comments)     \"I get loopy\"       PAST MEDICAL HISTORY:    Past Medical History:   Diagnosis Date     CAD (coronary artery disease) 05/01/2015    Left Heart cath - 90% stenosis mid LAD - PTCA/birfurcation stenting with MICKIE of mid LAD and 2nd diagonal branch placed and 100% occlusion 2nd diagonal branch - PTCA/MICKIE, 5/2015 EF 40-45% by Echo     Carcinoma in situ of skin      Depression      Hormone replacement therapy     she wants to continue HRT even though she is aware that it increases her lifetime risk of breast cancer.     HTN (hypertension)      Hyperlipidemia LDL goal <70      Ischemic cardiomyopathy      Menopausal syndrome      NSTEMI (non-ST elevated myocardial infarction) (H) 05/21/2015     Osteoporosis 01/01/2007     Shortness of breath        PAST SURGICAL HISTORY:    Past Surgical History:   Procedure Laterality Date     BIOPSY OF SKIN LESION       HEART CATH LEFT HEART CATH  5/21/2015    MICKIE to second diagonal branch, bifurcation stent to Mid LAD. Severe 90% stenosis in mid LAD and 100% occlusion of second diagonal branch     ORTHOPEDIC SURGERY  2006    hip replacement       FAMILY HISTORY:    Family History   Problem Relation Age of Onset     Cancer Mother      Coronary Artery Disease Father 77        MI     Breast Cancer Daughter 53        daughter has stage IV breast cancer at age 53       SOCIAL HISTORY:    Social History     Socioeconomic History     Marital status:      Spouse name: None     Number of children: 2     Years of education: None     Highest education level: None   Occupational History     Occupation: Retired   Tobacco Use     Smoking status: Never     Smokeless tobacco: Never   Substance and Sexual Activity     Alcohol use: Yes     Comment: Rare     Drug use: No     Sexual activity: Not Currently     Partners: Male     Birth control/protection: Post-menopausal   Other Topics Concern     Parent/sibling w/ CABG, MI or angioplasty before 65F 55M? No     Caffeine " Concern No     Comment: no caffeine     Sleep Concern No     Stress Concern No     Special Diet No     Exercise Yes     Comment: walking dog, 2 story house, folk dancing     Seat Belt Yes                         Service Date: 02/08/2023    PRIMARY CARE PROVIDER:  Shira Puckett.    REASON FOR VISIT:  1.  Establish care for his CAD, hyperlipidemia.  2.  Uncontrolled hypertension.    HISTORY OF PRESENT ILLNESS:  Charlotte Bradshaw is new to my practice.  She is here to establish care with a female provider.  Previously seen by my partner, Dr. Abilio Razo.    Charlotte was unaccompanied today, she is 87 years old, extremely frail with a low BMI of 18 kg/m2.  She has failed to thrive since the death of her  3 years ago.  Even today, she was tearful multiple times when mentioning personal circumstances and her .  She lives alone, her son lives 45 minutes away whom she sees infrequently, nontobacco user, does her own cooking, cleaning and grocery shopping.    Her cardiovascular history is significant for 2 episodes of non-STEMI, first in 2015 and subsequently in 2016, and she has been treated with bifurcation stenting of the LAD and diagonal arteries and right coronary artery, last intervention in 2016.  She has a history of ischemic cardiomyopathy with subsequent normalization of LVEF on her last echocardiogram in 2016.  LVEF was 55%-60%, grade II diastolic function, normal right ventricular systolic function, no significant valve disease.    Other comorbidities are hypertension and hyperlipidemia.    The following issues were addressed today:  1.  Coronary artery disease, status post LAD/diagonal and RCA stenting.  She is angina-free.  I personally reviewed her ECG today, which shows sinus bradycardia (she is on metoprolol) with no evidence of previous infarct. Echocardiogram from 2016 reviewed as documented above.  She shops in [employer] and is able to walk a whole isle without getting any angina, dyspnea or lower  extremity edema.    2.  Uncontrolled hypertension.  Her blood pressure is markedly elevated at 174/90 today with a pulse of 60 BPM.  She denies headache, shortness of breath or chest pain.  Currently, takes low-dose lisinopril 2.5 mg daily and metoprolol XL 12.5 mg daily.  No history of stroke.  Echocardiogram 2016 showed grade II diastolic dysfunction.  Eats soups that were gifted to her by friends and no doubt high in sodium.    3.  Hyperlipidemia with goal LDL less than 70.  She is on atorvastatin 40 mg daily.  Her last labs were in 2016.    PHYSICAL EXAMINATION:    CARDIOVASCULAR:  Regular heart sounds, no audible murmur.  Normal jugular venous pressure.  Undisplaced apical impulse.  RESPIRATORY:  Normal breath sounds.  No rales or wheeze.  EXTREMITIES:  No edema.    DIAGNOSES/ASSESSMENT:  1.  Uncontrolled hypertension.  She is on very small doses of medications.  Will optimize.  2.  Coronary artery disease without angina.  Continue aspirin and optimize beta blocker dosage.  3.  Hyperlipidemia.  No labs since 2016.  Will get updated labs and testing.  4.  Prolonged grief reaction with bereavement.  Seen by her primary provider 6 months ago and started on Lexapro, but it does not seem like she is taking that.  She is still very tearful when talking about the death of her  at age 89 years 3 years ago.  She feels socially isolated and reports stress.  However, she is well kempt and taking care of herself.  Denies feeling depressed or wanting to harm herself. Counseled her about this.    PLAN:    1.  Medication changes:  -- Take lisinopril 5 mg 2 times daily.  -- Take metoprolol XL 25 mg daily (instead of 12.5).  -- Continue atorvastatin and aspirin.    2.  I have ordered fasting labs, echocardiogram and followup with me.    Total time today 45 minutes.  Extensive counseling as the patient was tearful multiple times during our visit.      Manav Starks MD        D: 02/08/2023   T: 02/08/2023   MT:  ll    Name:     MAKENNA FOUNTAIN  MRN:      [id number]        Account:      [id number]   :      1935           Service Date: 2023       Document: [id number]      Thank you for allowing me to participate in the care of your patient.      Sincerely,     Manav Starks MD     [facility]  cc:   No referring provider defined for this encounter.

## 2023-02-24 ENCOUNTER — HOSPITAL ENCOUNTER (OUTPATIENT)
Dept: CARDIOLOGY | Facility: CLINIC | Age: 88
Discharge: HOME | End: 2023-02-24
Attending: INTERNAL MEDICINE | Admitting: INTERNAL MEDICINE
Payer: COMMERCIAL

## 2023-02-24 ENCOUNTER — LAB (OUTPATIENT)
Dept: LAB | Facility: CLINIC | Age: 88
End: 2023-02-24
Payer: COMMERCIAL

## 2023-02-24 DIAGNOSIS — E78.5 HYPERLIPIDEMIA LDL GOAL <100: ICD-10-CM

## 2023-02-24 DIAGNOSIS — I10 BENIGN ESSENTIAL HYPERTENSION: ICD-10-CM

## 2023-02-24 DIAGNOSIS — I25.10 CORONARY ARTERY DISEASE INVOLVING NATIVE CORONARY ARTERY OF NATIVE HEART WITHOUT ANGINA PECTORIS: ICD-10-CM

## 2023-02-24 LAB
ANION GAP SERPL CALCULATED.3IONS-SCNC: 7 MMOL/L (ref 7–15)
BUN SERPL-MCNC: 26.7 MG/DL (ref 8–23)
CALCIUM SERPL-MCNC: 9.7 MG/DL (ref 8.8–10.2)
CHLORIDE SERPL-SCNC: 105 MMOL/L (ref 98–107)
CHOLEST SERPL-MCNC: 144 MG/DL
CREAT SERPL-MCNC: 1.03 MG/DL (ref 0.51–0.95)
DEPRECATED HCO3 PLAS-SCNC: 31 MMOL/L (ref 22–29)
ERYTHROCYTE [DISTWIDTH] IN BLOOD BY AUTOMATED COUNT: 11.7 % (ref 10–15)
GFR SERPL CREATININE-BSD FRML MDRD: 52 ML/MIN/1.73M2
GLUCOSE SERPL-MCNC: 101 MG/DL (ref 70–99)
HCT VFR BLD AUTO: 41.8 % (ref 35–47)
HDLC SERPL-MCNC: 76 MG/DL
HGB BLD-MCNC: 13.2 G/DL (ref 11.7–15.7)
LDLC SERPL CALC-MCNC: 55 MG/DL
LVEF ECHO: NORMAL
MCH RBC QN AUTO: 32.9 PG (ref 26.5–33)
MCHC RBC AUTO-ENTMCNC: 31.6 G/DL (ref 31.5–36.5)
MCV RBC AUTO: 104 FL (ref 78–100)
NONHDLC SERPL-MCNC: 68 MG/DL
PLATELET # BLD AUTO: 188 10E3/UL (ref 150–450)
POTASSIUM SERPL-SCNC: 4.2 MMOL/L (ref 3.4–5.3)
RBC # BLD AUTO: 4.01 10E6/UL (ref 3.8–5.2)
SODIUM SERPL-SCNC: 143 MMOL/L (ref 136–145)
TRIGL SERPL-MCNC: 64 MG/DL
TSH SERPL DL<=0.005 MIU/L-ACNC: 2.59 UIU/ML (ref 0.3–4.2)
WBC # BLD AUTO: 5.8 10E3/UL (ref 4–11)

## 2023-02-24 PROCEDURE — 84443 ASSAY THYROID STIM HORMONE: CPT | Performed by: INTERNAL MEDICINE

## 2023-02-24 PROCEDURE — 93306 TTE W/DOPPLER COMPLETE: CPT | Mod: 26 | Performed by: INTERNAL MEDICINE

## 2023-02-24 PROCEDURE — 80061 LIPID PANEL: CPT | Performed by: INTERNAL MEDICINE

## 2023-02-24 PROCEDURE — 93306 TTE W/DOPPLER COMPLETE: CPT

## 2023-02-24 PROCEDURE — 80048 BASIC METABOLIC PNL TOTAL CA: CPT | Performed by: INTERNAL MEDICINE

## 2023-02-24 PROCEDURE — 36415 COLL VENOUS BLD VENIPUNCTURE: CPT | Performed by: INTERNAL MEDICINE

## 2023-02-24 PROCEDURE — 85027 COMPLETE CBC AUTOMATED: CPT | Performed by: INTERNAL MEDICINE

## 2023-03-02 ENCOUNTER — TELEPHONE (OUTPATIENT)
Dept: CARDIOLOGY | Facility: CLINIC | Age: 88
End: 2023-03-02
Payer: COMMERCIAL

## 2023-03-02 NOTE — TELEPHONE ENCOUNTER
Called pharmacy, said the prescription was sent 4/8/22 by Shira Puckett.     Spoke to patient and reviewed that the prescription was sent by her primary care clinic. She verbalized understanding, said she will call them about it tomorrow.

## 2023-03-02 NOTE — TELEPHONE ENCOUNTER
Health Call Center    Phone Message    May a detailed message be left on voicemail: yes     Reason for Call: Medication Question or concern regarding medication   Prescription Clarification  Name of Medication: Sertraline 25 MG Tablet  Prescribing Provider: Dr Starks    Pharmacy: [id number] [address] AT [place] [address] & [facility],     What on the order needs clarification? The patient said she received a RX for this medication from the pharmacy and she is not sure when Dr Starks prescribed this.    Please call her to discuss      Action Taken: Other: Cardiology    Travel Screening: Not Applicable     Thank you!  [facility]

## 2023-03-08 ENCOUNTER — OFFICE VISIT (OUTPATIENT)
Dept: CARDIOLOGY | Facility: CLINIC | Age: 88
End: 2023-03-08
Attending: INTERNAL MEDICINE
Payer: COMMERCIAL

## 2023-03-08 VITALS
HEIGHT: 61 IN | HEART RATE: 52 BPM | WEIGHT: 100 LBS | SYSTOLIC BLOOD PRESSURE: 132 MMHG | BODY MASS INDEX: 18.88 KG/M2 | OXYGEN SATURATION: 98 % | DIASTOLIC BLOOD PRESSURE: 70 MMHG

## 2023-03-08 DIAGNOSIS — I10 BENIGN ESSENTIAL HYPERTENSION: ICD-10-CM

## 2023-03-08 DIAGNOSIS — E78.5 HYPERLIPIDEMIA LDL GOAL <70: ICD-10-CM

## 2023-03-08 DIAGNOSIS — I25.10 CORONARY ARTERY DISEASE INVOLVING NATIVE CORONARY ARTERY OF NATIVE HEART WITHOUT ANGINA PECTORIS: Primary | ICD-10-CM

## 2023-03-08 PROCEDURE — 99214 OFFICE O/P EST MOD 30 MIN: CPT | Performed by: INTERNAL MEDICINE

## 2023-03-08 NOTE — LETTER
3/8/2023    DO [facility] [address] [facility] [place] [address]    RE: Charlotte Bradshaw       Dear Colleague,     I had the pleasure of seeing Charlotte Bradshaw in the [facility].    SERVICE DATE: 3/8/2023    PRIMARY CARE PROVIDER:  Shira Puckett [facility] [address] [facility] [place] [address]    REASON FOR VISIT:  Follow-up of hypertension, CAD, hyperlipidemia    HISTORY OF PRESENT ILLNESS:  Kayla is a very pleasant 87-year-old  lady with low BMI of 18 failure to thrive since the death of her  3 years ago.  She lives alone but her son lives 45 minutes away from her home (whom she sees infrequently), still drives, never tobacco user, independent and self caring.    Cardiovascular history significant for 2 episodes of non-STEMI (first in 2015, next in 2016) treated with bifurcation stenting of the LAD and diagonal arteries and right coronary artery.  History of ischemic cardiomyopathy with subsequent normalization of LVEF in 2016.  Hypertension and hyperlipidemia.    I had seen her recently for uncontrolled hypertension with BP of 174/90 mmHg.  He was asymptomatic.  I had started her on lisinopril 5 mg 2 times daily and increase metoprolol XL to 25 mg daily.  She has tolerated this well.  No side effects.  Suspect renal panel with a sodium of 143, potassium 4.2, BUN 26, creatinine 1.0.    I reviewed her lipid panel which is excellent with a total cholesterol of 144, HDL 76, LDL at goal at 55, triglycerides 64.  She is on atorvastatin 40 mg daily.  Normal TSH.  Normal CBC.    I reviewed echocardiogram images which show normal LVEF of 65-70%, no regional wall motion abnormalities, normal right ventricular systolic function, aortic valve sclerosis without significant stenosis, normal IVC, normal right ventricular systolic function.    Diagnosis and treatment impacted by social determinants of health: Elderly, frail, lives  "alone    DIAGNOSTIC DATA:  I personally reviewed and independently interpreted echocardiogram images and labs as documented above.    PHYSICAL EXAMINATION:  Vitals: /70   Pulse 52   Ht 1.549 m (5' 1\")   Wt 45.4 kg (100 lb)   SpO2 98%   BMI 18.89 kg/m    Wt Readings from Last 5 Encounters:   03/08/23 45.4 kg (100 lb)   02/08/23 44 kg (97 lb)   02/02/22 45.9 kg (101 lb 4.8 oz)   04/06/21 45.4 kg (100 lb)   02/02/21 51.7 kg (114 lb)       DIAGNOSES/ASSESSMENT:  1.  Coronary artery disease without angina status post LAD and right coronary artery stenting in 2016.  Normal LVEF.  2.  Benign essential hypertension.  Well treated after medication changes.  3.  Hyperlipidemia with goal LDL less than 70.  Continue atorvastatin 40 mg daily.    PLAN:  1.  Renewed prescriptions without changes.    2.  Testing and follow up:  -- Reviewed test results with patient.  She was reassured.  --Follow-up with me in 1 year with previsit fasting labs and ECG.      Manav Starks MD      Today's clinic visit entailed:  Established patient.  The level of medical decision making during this visit was of moderate complexity.        Encounter Diagnoses   Name Primary?     Coronary artery disease involving native coronary artery of native heart without angina pectoris Yes     Benign essential hypertension      Hyperlipidemia LDL goal <70          Orders Placed This Encounter   Procedures     Basic metabolic panel     CBC with platelets     Lipid Profile     Follow-Up with Cardiology     EKG 12-lead complete w/read - Clinics (to be scheduled)           CURRENT MEDICATIONS:  Current Outpatient Medications   Medication Sig Dispense Refill     ammonium lactate (AMLACTIN) 12 % cream Apply topically daily as needed        aspirin EC 81 MG EC tablet Take 1 tablet (81 mg) by mouth daily (Patient taking differently: Take 81 mg by mouth) 30 tablet 0     atorvastatin (LIPITOR) 40 MG tablet Take 1 tablet (40 mg) by mouth daily 100 tablet 4 " "    lisinopril (ZESTRIL) 5 MG tablet Take 1 tablet (5 mg) by mouth 2 times daily 200 tablet 4     metoprolol succinate ER (TOPROL XL) 25 MG 24 hr tablet Take 1 tablet (25 mg) by mouth every morning 100 tablet 5         ALLERGIES:  Allergies   Allergen Reactions     Codeine Camsylate Other (See Comments)     \"I get loopy\"       PAST MEDICAL HISTORY:    Past Medical History:   Diagnosis Date     Benign essential hypertension      CAD (coronary artery disease) 05/01/2015    Left Heart cath - 90% stenosis mid LAD - PTCA/birfurcation stenting with MICKIE of mid LAD and 2nd diagonal branch placed and 100% occlusion 2nd diagonal branch - PTCA/MICKIE, 5/2015 EF 40-45% by Echo     Carcinoma in situ of skin      Depression      Hormone replacement therapy     she wants to continue HRT even though she is aware that it increases her lifetime risk of breast cancer.     Hyperlipidemia LDL goal <70      Ischemic cardiomyopathy      Menopausal syndrome      NSTEMI (non-ST elevated myocardial infarction) (H) 05/21/2015     Osteoporosis 01/01/2007       PAST SURGICAL HISTORY:    Past Surgical History:   Procedure Laterality Date     BIOPSY OF SKIN LESION       HEART CATH LEFT HEART CATH  5/21/2015    MICKIE to second diagonal branch, bifurcation stent to Mid LAD. Severe 90% stenosis in mid LAD and 100% occlusion of second diagonal branch     ORTHOPEDIC SURGERY  2006    hip replacement       FAMILY HISTORY:    Family History   Problem Relation Age of Onset     Cancer Mother      Coronary Artery Disease Father 77        MI     Breast Cancer Daughter 53        daughter has stage IV breast cancer at age 53       SOCIAL HISTORY:    Social History     Socioeconomic History     Marital status:      Spouse name: None     Number of children: 2     Years of education: None     Highest education level: None   Occupational History     Occupation: Retired   Tobacco Use     Smoking status: Never     Smokeless tobacco: Never   Substance and Sexual " Activity     Alcohol use: Yes     Comment: Rare     Drug use: No     Sexual activity: Not Currently     Partners: Male     Birth control/protection: Post-menopausal   Other Topics Concern     Parent/sibling w/ CABG, MI or angioplasty before 65F 55M? No     Caffeine Concern No     Comment: no caffeine     Sleep Concern No     Stress Concern No     Special Diet No     Exercise Yes     Comment: walking dog, 2 story house, folk dancing     Seat Belt Yes           Thank you for allowing me to participate in the care of your patient.    Sincerely,     Manav Starks MD     [facility]

## 2023-03-08 NOTE — PROGRESS NOTES
"  SERVICE DATE: 3/8/2023    PRIMARY CARE PROVIDER:  Shira Puckett  [facility] [address] [facility] [address]    REASON FOR VISIT:  Follow-up of hypertension, CAD, hyperlipidemia    HISTORY OF PRESENT ILLNESS:  Kayla is a very pleasant 87-year-old  lady with low BMI of 18 failure to thrive since the death of her  3 years ago.  She lives alone but her son lives 45 minutes away from her home (whom she sees infrequently), still drives, never tobacco user, independent and self caring.    Cardiovascular history significant for 2 episodes of non-STEMI (first in 2015, next in 2016) treated with bifurcation stenting of the LAD and diagonal arteries and right coronary artery.  History of ischemic cardiomyopathy with subsequent normalization of LVEF in 2016.  Hypertension and hyperlipidemia.    I had seen her recently for uncontrolled hypertension with BP of 174/90 mmHg.  He was asymptomatic.  I had started her on lisinopril 5 mg 2 times daily and increase metoprolol XL to 25 mg daily.  She has tolerated this well.  No side effects.  Suspect renal panel with a sodium of 143, potassium 4.2, BUN 26, creatinine 1.0.    I reviewed her lipid panel which is excellent with a total cholesterol of 144, HDL 76, LDL at goal at 55, triglycerides 64.  She is on atorvastatin 40 mg daily.  Normal TSH.  Normal CBC.    I reviewed echocardiogram images which show normal LVEF of 65-70%, no regional wall motion abnormalities, normal right ventricular systolic function, aortic valve sclerosis without significant stenosis, normal IVC, normal right ventricular systolic function.    Diagnosis and treatment impacted by social determinants of health: Elderly, frail, lives alone    DIAGNOSTIC DATA:  I personally reviewed and independently interpreted echocardiogram images and labs as documented above.    PHYSICAL EXAMINATION:  Vitals: /70   Pulse 52   Ht 1.549 m (5' 1\")   Wt 45.4 kg (100 lb)   SpO2 " 98%   BMI 18.89 kg/m    Wt Readings from Last 5 Encounters:   03/08/23 45.4 kg (100 lb)   02/08/23 44 kg (97 lb)   02/02/22 45.9 kg (101 lb 4.8 oz)   04/06/21 45.4 kg (100 lb)   02/02/21 51.7 kg (114 lb)       DIAGNOSES/ASSESSMENT:  1.  Coronary artery disease without angina status post LAD and right coronary artery stenting in 2016.  Normal LVEF.  2.  Benign essential hypertension.  Well treated after medication changes.  3.  Hyperlipidemia with goal LDL less than 70.  Continue atorvastatin 40 mg daily.    PLAN:  1.  Renewed prescriptions without changes.    2.  Testing and follow up:  -- Reviewed test results with patient.  She was reassured.  --Follow-up with me in 1 year with previsit fasting labs and ECG.      Manav Starks MD      Today's clinic visit entailed:  Established patient.  The level of medical decision making during this visit was of moderate complexity.        Encounter Diagnoses   Name Primary?     Coronary artery disease involving native coronary artery of native heart without angina pectoris Yes     Benign essential hypertension      Hyperlipidemia LDL goal <70          Orders Placed This Encounter   Procedures     Basic metabolic panel     CBC with platelets     Lipid Profile     Follow-Up with Cardiology     EKG 12-lead complete w/read - Clinics (to be scheduled)           CURRENT MEDICATIONS:  Current Outpatient Medications   Medication Sig Dispense Refill     ammonium lactate (AMLACTIN) 12 % cream Apply topically daily as needed        aspirin EC 81 MG EC tablet Take 1 tablet (81 mg) by mouth daily (Patient taking differently: Take 81 mg by mouth) 30 tablet 0     atorvastatin (LIPITOR) 40 MG tablet Take 1 tablet (40 mg) by mouth daily 100 tablet 4     lisinopril (ZESTRIL) 5 MG tablet Take 1 tablet (5 mg) by mouth 2 times daily 200 tablet 4     metoprolol succinate ER (TOPROL XL) 25 MG 24 hr tablet Take 1 tablet (25 mg) by mouth every morning 100 tablet 5         ALLERGIES:  Allergies  "  Allergen Reactions     Codeine Camsylate Other (See Comments)     \"I get loopy\"       PAST MEDICAL HISTORY:    Past Medical History:   Diagnosis Date     Benign essential hypertension      CAD (coronary artery disease) 05/01/2015    Left Heart cath - 90% stenosis mid LAD - PTCA/birfurcation stenting with MICKIE of mid LAD and 2nd diagonal branch placed and 100% occlusion 2nd diagonal branch - PTCA/MICKIE, 5/2015 EF 40-45% by Echo     Carcinoma in situ of skin      Depression      Hormone replacement therapy     she wants to continue HRT even though she is aware that it increases her lifetime risk of breast cancer.     Hyperlipidemia LDL goal <70      Ischemic cardiomyopathy      Menopausal syndrome      NSTEMI (non-ST elevated myocardial infarction) (H) 05/21/2015     Osteoporosis 01/01/2007       PAST SURGICAL HISTORY:    Past Surgical History:   Procedure Laterality Date     BIOPSY OF SKIN LESION       HEART CATH LEFT HEART CATH  5/21/2015    MICKIE to second diagonal branch, bifurcation stent to Mid LAD. Severe 90% stenosis in mid LAD and 100% occlusion of second diagonal branch     ORTHOPEDIC SURGERY  2006    hip replacement       FAMILY HISTORY:    Family History   Problem Relation Age of Onset     Cancer Mother      Coronary Artery Disease Father 77        MI     Breast Cancer Daughter 53        daughter has stage IV breast cancer at age 53       SOCIAL HISTORY:    Social History     Socioeconomic History     Marital status:      Spouse name: None     Number of children: 2     Years of education: None     Highest education level: None   Occupational History     Occupation: Retired   Tobacco Use     Smoking status: Never     Smokeless tobacco: Never   Substance and Sexual Activity     Alcohol use: Yes     Comment: Rare     Drug use: No     Sexual activity: Not Currently     Partners: Male     Birth control/protection: Post-menopausal   Other Topics Concern     Parent/sibling w/ CABG, MI or angioplasty before " 65F 55M? No     Caffeine Concern No     Comment: no caffeine     Sleep Concern No     Stress Concern No     Special Diet No     Exercise Yes     Comment: walking dog, 2 story house, folk dancing     Seat Belt Yes

## 2024-04-05 ENCOUNTER — TELEPHONE (OUTPATIENT)
Dept: CARDIOLOGY | Facility: CLINIC | Age: 89
End: 2024-04-05
Payer: COMMERCIAL

## 2024-04-05 DIAGNOSIS — I10 BENIGN ESSENTIAL HYPERTENSION: Primary | ICD-10-CM

## 2024-04-05 DIAGNOSIS — E78.5 HLD (HYPERLIPIDEMIA): Primary | ICD-10-CM

## 2024-04-05 RX ORDER — METOPROLOL SUCCINATE 25 MG/1
25 TABLET, EXTENDED RELEASE ORAL EVERY MORNING
Qty: 90 TABLET | Refills: 0 | Status: SHIPPED | OUTPATIENT
Start: 2024-04-05

## 2024-04-05 RX ORDER — ATORVASTATIN CALCIUM 40 MG/1
40 TABLET, FILM COATED ORAL DAILY
Qty: 90 TABLET | Refills: 0 | Status: SHIPPED | OUTPATIENT
Start: 2024-04-05

## 2024-04-05 RX ORDER — LISINOPRIL 5 MG/1
5 TABLET ORAL 2 TIMES DAILY
Qty: 180 TABLET | Refills: 0 | Status: SHIPPED | OUTPATIENT
Start: 2024-04-05

## 2024-06-03 ENCOUNTER — TRANSCRIBE ORDERS (OUTPATIENT)
Dept: OTHER | Age: 89
End: 2024-06-03

## 2024-06-03 DIAGNOSIS — I25.5 ISCHEMIC CARDIOMYOPATHY: Primary | ICD-10-CM

## 2024-07-08 DIAGNOSIS — E78.5 HLD (HYPERLIPIDEMIA): ICD-10-CM

## 2024-07-08 DIAGNOSIS — I10 BENIGN ESSENTIAL HYPERTENSION: ICD-10-CM

## 2024-07-08 RX ORDER — ATORVASTATIN CALCIUM 40 MG/1
40 TABLET, FILM COATED ORAL DAILY
Qty: 90 TABLET | Refills: 0 | Status: CANCELLED | OUTPATIENT
Start: 2024-07-08

## 2024-07-08 RX ORDER — METOPROLOL SUCCINATE 25 MG/1
25 TABLET, EXTENDED RELEASE ORAL EVERY MORNING
Qty: 90 TABLET | Refills: 0 | Status: CANCELLED | OUTPATIENT
Start: 2024-07-08

## 2024-07-08 NOTE — TELEPHONE ENCOUNTER
[facility] Cardiology Refill Guideline reviewed.  Medication does not meet criteria for refill due to patient is due for an appointment. No appointment is currently scheduled. Short term supply of medication and refill letter were sent on 4/5/24.  Messaged to providers team for further review.